# Patient Record
Sex: FEMALE | Race: OTHER | Employment: UNEMPLOYED | URBAN - METROPOLITAN AREA
[De-identification: names, ages, dates, MRNs, and addresses within clinical notes are randomized per-mention and may not be internally consistent; named-entity substitution may affect disease eponyms.]

---

## 2018-01-17 ENCOUNTER — OFFICE VISIT (OUTPATIENT)
Dept: ORTHOPEDIC SURGERY | Age: 23
End: 2018-01-17

## 2018-01-17 VITALS
BODY MASS INDEX: 28.91 KG/M2 | HEIGHT: 66 IN | DIASTOLIC BLOOD PRESSURE: 73 MMHG | SYSTOLIC BLOOD PRESSURE: 122 MMHG | HEART RATE: 78 BPM | WEIGHT: 179.9 LBS

## 2018-01-17 DIAGNOSIS — M25.511 ACUTE PAIN OF RIGHT SHOULDER: Primary | ICD-10-CM

## 2018-01-17 PROCEDURE — 99203 OFFICE O/P NEW LOW 30 MIN: CPT | Performed by: ORTHOPAEDIC SURGERY

## 2018-01-17 PROCEDURE — L3660 SO 8 AB RSTR CAN/WEB PRE OTS: HCPCS | Performed by: ORTHOPAEDIC SURGERY

## 2018-01-17 PROCEDURE — G8419 CALC BMI OUT NRM PARAM NOF/U: HCPCS | Performed by: ORTHOPAEDIC SURGERY

## 2018-01-17 PROCEDURE — G8427 DOCREV CUR MEDS BY ELIG CLIN: HCPCS | Performed by: ORTHOPAEDIC SURGERY

## 2018-01-17 PROCEDURE — 4004F PT TOBACCO SCREEN RCVD TLK: CPT | Performed by: ORTHOPAEDIC SURGERY

## 2018-01-17 PROCEDURE — G8484 FLU IMMUNIZE NO ADMIN: HCPCS | Performed by: ORTHOPAEDIC SURGERY

## 2018-01-17 NOTE — PROGRESS NOTES
that she drinks alcohol. She reports that she does not use drugs. Family History:   No family history on file. REVIEW OF SYSTEMS:   For new problems, a full review of systems will be found scanned in the patient's chart. CONSTITUTIONAL: Denies unexplained weight loss, fevers, chills   NEUROLOGICAL: Denies unsteady gait or progressive weakness  SKIN: Denies skin changes, delayed healing, rash, itching       PHYSICAL EXAM:    Vitals: Blood pressure 122/73, pulse 78, height 5' 5.98\" (1.676 m), weight 179 lb 14.3 oz (81.6 kg), last menstrual period 12/31/2017. GENERAL EXAM:  · General Apparence: Patient is adequately groomed with no evidence of malnutrition. · Orientation: The patient is oriented to time, place and person. · Mood & Affect:The patient's mood and affect are appropriate       Right shoulder PHYSICAL EXAMINATION:  · Inspection:  No visible deformity. No significant edema, erythema or ecchymosis. · Palpation:  Tenderness to palpation diffusely throughout the anterior and posterior shoulder      · Range of Motion: Range of motion not tested secondary to injury    · Strength:  strength equal bilaterally    · Special Tests:  Unable to perform apprehension testing secondary to severe pain. Neurovascular exam is intact distally. · Skin:  There are no rashes, ulcerations or lesions. · Gait & station: Normal gait      · Additional Examinations:        Left Upper Extremity: Examination of the left upper extremity does not show any tenderness, deformity or injury. Range of motion is unremarkable. There is no gross instability. There are no rashes, ulcerations or lesions. Strength and tone are normal.      Diagnostic Testing:      X-rays of the right shoulder were reviewed from her emergency room visit and reveal a well reduced shoulder with no acute bony abnormalities. Orders   No orders of the defined types were placed in this encounter.         Assessment / Treatment Plan: 1.  History of dislocation right shoulder. I discussed the epidemiology of shoulder dislocations with the patient. I discussed with her the nature of this injury and the need for immobilization. She is going to remain in her sling and swath for the next 2 weeks. At that point, she will return and we will begin physical therapy. I stressed to her the importance of ice and rest.    She was provided with a short shoulder immobilizer today. I have personally performed and/or participated in the history, exam and medical decision making and agree with all pertinent clinical information. I have also reviewed and agree with the past medical, family and social history unless otherwise noted. This dictation was performed with a verbal recognition program (DRAGON) and it was checked for errors. It is possible that there are still dictated errors within this office note. If so, please bring any errors to my attention for an addendum. All efforts were made to ensure that this office note is accurate.           Alana León MD

## 2018-01-31 ENCOUNTER — OFFICE VISIT (OUTPATIENT)
Dept: ORTHOPEDIC SURGERY | Age: 23
End: 2018-01-31

## 2018-01-31 VITALS
SYSTOLIC BLOOD PRESSURE: 101 MMHG | WEIGHT: 179.9 LBS | HEIGHT: 66 IN | HEART RATE: 77 BPM | DIASTOLIC BLOOD PRESSURE: 63 MMHG | BODY MASS INDEX: 28.91 KG/M2

## 2018-01-31 DIAGNOSIS — S43.301S: Primary | ICD-10-CM

## 2018-01-31 PROCEDURE — 4004F PT TOBACCO SCREEN RCVD TLK: CPT | Performed by: ORTHOPAEDIC SURGERY

## 2018-01-31 PROCEDURE — G8484 FLU IMMUNIZE NO ADMIN: HCPCS | Performed by: ORTHOPAEDIC SURGERY

## 2018-01-31 PROCEDURE — G8427 DOCREV CUR MEDS BY ELIG CLIN: HCPCS | Performed by: ORTHOPAEDIC SURGERY

## 2018-01-31 PROCEDURE — G8419 CALC BMI OUT NRM PARAM NOF/U: HCPCS | Performed by: ORTHOPAEDIC SURGERY

## 2018-01-31 PROCEDURE — 99214 OFFICE O/P EST MOD 30 MIN: CPT | Performed by: ORTHOPAEDIC SURGERY

## 2018-01-31 NOTE — PROGRESS NOTES
CHIEF COMPLAINT:    Chief Complaint   Patient presents with    Shoulder Pain     CK RIGHT SHOULDER. PT STATES DOING BETTER, STILL SORE W. CERTAIN MOVEMENTS       HISTORY OF PRESENT ILLNESS:                The patient is a 23 y.o. female   History reviewed. No pertinent past medical history. This patient was initially seen on 1/13/18 for a right shoulder subluxation/dislocation event. She's been in the sling primarily for last couple weeks. She has taken it often has worked on moving her shoulder. She is as she is much better but still feels clicking and popping in her shoulder. The pain assessment was noted & is as follows:  Pain Assessment  Location of Pain: Shoulder  Location Modifiers: Right  Severity of Pain: 4  Quality of Pain: Dull, Sharp, Aching  Duration of Pain: A few minutes  Frequency of Pain: Several times daily  Aggravating Factors: Bending, Other (Comment) (CERTAIN MOVEMENTS)  Limiting Behavior: Some  Relieving Factors: Rest  Result of Injury: No  Work-Related Injury: No  Are there other pain locations you wish to document?: No]      Work Status/Functionality:     Past Medical History: Medical history form was reviewed today & can be found in the media tab  History reviewed. No pertinent past medical history. Past Surgical History:     Past Surgical History:   Procedure Laterality Date    CHOLECYSTECTOMY       Current Medications:     Current Outpatient Prescriptions:     ibuprofen (ADVIL;MOTRIN) 800 MG tablet, Take 1 tablet by mouth every 8 hours as needed for Pain, Disp: 20 tablet, Rfl: 0  Allergies:  Review of patient's allergies indicates no known allergies. Social History:    reports that she has been smoking Cigarettes. She has been smoking about 0.50 packs per day. She has never used smokeless tobacco. She reports that she drinks alcohol. She reports that she does not use drugs. Family History:   History reviewed. No pertinent family history.     REVIEW OF SYSTEMS:   For new

## 2018-08-05 ENCOUNTER — APPOINTMENT (OUTPATIENT)
Dept: GENERAL RADIOLOGY | Age: 23
End: 2018-08-05
Payer: MEDICAID

## 2018-08-05 ENCOUNTER — HOSPITAL ENCOUNTER (EMERGENCY)
Age: 23
Discharge: HOME | End: 2018-08-05
Payer: MEDICAID

## 2018-08-05 VITALS
OXYGEN SATURATION: 100 % | SYSTOLIC BLOOD PRESSURE: 121 MMHG | TEMPERATURE: 98.5 F | HEIGHT: 66 IN | RESPIRATION RATE: 16 BRPM | BODY MASS INDEX: 29.41 KG/M2 | HEART RATE: 72 BPM | WEIGHT: 183 LBS | DIASTOLIC BLOOD PRESSURE: 90 MMHG

## 2018-08-05 DIAGNOSIS — S90.812A ABRASION OF LEFT FOOT, INITIAL ENCOUNTER: ICD-10-CM

## 2018-08-05 DIAGNOSIS — S20.212A RIB CONTUSION, LEFT, INITIAL ENCOUNTER: ICD-10-CM
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
## 2018-08-05 PROCEDURE — 71046 X-RAY EXAM CHEST 2 VIEWS: CPT

## 2018-08-05 PROCEDURE — 96372 THER/PROPH/DIAG INJ SC/IM: CPT

## 2018-08-05 PROCEDURE — 99283 EMERGENCY DEPT VISIT LOW MDM: CPT

## 2018-08-05 PROCEDURE — 6360000002 HC RX W HCPCS: Performed by: NURSE PRACTITIONER

## 2018-08-05 RX ORDER — NAPROXEN 500 MG/1
500 TABLET ORAL 2 TIMES DAILY WITH MEALS
Qty: 30 TABLET | Refills: 0 | Status: SHIPPED | OUTPATIENT
Start: 2018-08-05 | End: 2019-01-03

## 2018-08-05 RX ORDER — KETOROLAC TROMETHAMINE 30 MG/ML
30 INJECTION, SOLUTION INTRAMUSCULAR; INTRAVENOUS ONCE
Status: COMPLETED | OUTPATIENT
Start: 2018-08-05 | End: 2018-08-05

## 2018-08-05 RX ORDER — METHOCARBAMOL 500 MG/1
500 TABLET, FILM COATED ORAL 4 TIMES DAILY
Qty: 40 TABLET | Refills: 0 | Status: SHIPPED | OUTPATIENT
Start: 2018-08-05 | End: 2018-08-15

## 2018-08-05 RX ADMIN — KETOROLAC TROMETHAMINE 30 MG: 30 INJECTION, SOLUTION INTRAMUSCULAR at 14:22

## 2018-08-05 ASSESSMENT — ENCOUNTER SYMPTOMS
WHEEZING: 0
NAUSEA: 0
COUGH: 0
SHORTNESS OF BREATH: 0
VOMITING: 0
BACK PAIN: 0
COLOR CHANGE: 0
ABDOMINAL PAIN: 0
DIARRHEA: 0

## 2018-08-05 ASSESSMENT — PAIN SCALES - GENERAL
PAINLEVEL_OUTOF10: 6
PAINLEVEL_OUTOF10: 8
PAINLEVEL_OUTOF10: 7

## 2018-08-05 ASSESSMENT — PAIN DESCRIPTION - ORIENTATION: ORIENTATION: LEFT

## 2018-08-05 ASSESSMENT — PAIN DESCRIPTION - LOCATION: LOCATION: RIB CAGE

## 2018-08-05 NOTE — LETTER
[facility]  ED  [address] [place] [address]  Phone: [phone]  Fax: [phone]               August 5, 2018    Patient: Ashlyn Dong   YOB: 1995   Date of Visit: 8/5/2018       To Whom It May Concern:    Raine Baig was seen and treated in our emergency department on 8/5/2018. She may return to work on August 6, 2018. Barrera Benson       Sincerely,       Bernardo Ontiveros LPN         Signature:__________________________________
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
## 2018-08-05 NOTE — ED NOTES
Patient verbalized understanding of d/c instructions. Pt given Robaxin and Naprosyn scripts. Writer applied dressing to patient's left heel abrasion. Adaptic applied, covered with 4 x 4 gauze, wrapped with a four inch cling. Pt ambulated from ER without difficulty. Pt given a work excuse to return to work tomorrow.       Seda Ornelas LPN  13/64/12 0665

## 2018-09-07 ENCOUNTER — APPOINTMENT (OUTPATIENT)
Dept: GENERAL RADIOLOGY | Age: 23
End: 2018-09-07
Payer: MEDICAID

## 2018-09-07 ENCOUNTER — HOSPITAL ENCOUNTER (EMERGENCY)
Age: 23
Discharge: HOME | End: 2018-09-08
Attending: EMERGENCY MEDICINE
Payer: MEDICAID

## 2018-09-07 DIAGNOSIS — Z72.0 TOBACCO USE: ICD-10-CM

## 2018-09-07 DIAGNOSIS — J40 BRONCHITIS: Primary | ICD-10-CM

## 2018-09-07 PROCEDURE — 71046 X-RAY EXAM CHEST 2 VIEWS: CPT

## 2018-09-07 PROCEDURE — 99283 EMERGENCY DEPT VISIT LOW MDM: CPT

## 2018-09-08 VITALS
DIASTOLIC BLOOD PRESSURE: 76 MMHG | HEART RATE: 79 BPM | RESPIRATION RATE: 20 BRPM | HEIGHT: 66 IN | BODY MASS INDEX: 28.93 KG/M2 | WEIGHT: 180 LBS | OXYGEN SATURATION: 99 % | SYSTOLIC BLOOD PRESSURE: 120 MMHG | TEMPERATURE: 99 F

## 2018-09-08 RX ORDER — ALBUTEROL SULFATE 90 UG/1
2 AEROSOL, METERED RESPIRATORY (INHALATION) EVERY 4 HOURS PRN
Qty: 1 INHALER | Refills: 3 | Status: SHIPPED | OUTPATIENT
Start: 2018-09-08 | End: 2019-01-03

## 2018-09-08 RX ORDER — METHYLPREDNISOLONE 4 MG/1
TABLET ORAL
Qty: 1 KIT | Refills: 0 | Status: SHIPPED | OUTPATIENT
Start: 2018-09-08 | End: 2019-01-03

## 2018-09-08 NOTE — ED PROVIDER NOTES
I independently performed a history and physical on [address]. All diagnostic, treatment, and disposition decisions were made by myself in conjunction with the resident below    For further details of [address] emergency department encounter, please see Nabeel Navarrete DO's note for full details of patient's visit. Patient presents to the emergency room department complaining of cough. She reports upper respiratory congestion with somewhat productive cough over the last 2 weeks. No fevers, chills or sweats. Patient has ongoing tobacco abuse. Physical exam: Gen.: No acute distress. Moderate rhonchi. No wheezing, or rales. No work of breathing. Assessment/plan:    1. Bronchitis    2.  Tobacco use           James Mcnally DO  09/08/18 0021

## 2018-09-14 ENCOUNTER — HOSPITAL ENCOUNTER (EMERGENCY)
Age: 23
Discharge: HOME | End: 2018-09-15
Payer: MEDICAID

## 2018-09-14 VITALS
DIASTOLIC BLOOD PRESSURE: 75 MMHG | SYSTOLIC BLOOD PRESSURE: 117 MMHG | TEMPERATURE: 98.5 F | OXYGEN SATURATION: 100 % | BODY MASS INDEX: 28.93 KG/M2 | RESPIRATION RATE: 16 BRPM | WEIGHT: 180 LBS | HEIGHT: 66 IN | HEART RATE: 84 BPM

## 2018-09-14 DIAGNOSIS — N30.01 ACUTE CYSTITIS WITH HEMATURIA: Primary | ICD-10-CM

## 2018-09-14 LAB
BILIRUBIN URINE: NEGATIVE
BLOOD, URINE: ABNORMAL
CLARITY: CLEAR
COLOR: YELLOW
GLUCOSE URINE: NEGATIVE MG/DL
HCG(URINE) PREGNANCY TEST: NEGATIVE
KETONES, URINE: NEGATIVE MG/DL
LEUKOCYTE ESTERASE, URINE: ABNORMAL
MICROSCOPIC EXAMINATION: YES
NITRITE, URINE: POSITIVE
PH UA: 6
PROTEIN UA: 100 MG/DL
SPECIFIC GRAVITY UA: 1.02
URINE REFLEX TO CULTURE: YES
URINE TYPE: ABNORMAL
UROBILINOGEN, URINE: 1 E.U./DL

## 2018-09-14 PROCEDURE — 99283 EMERGENCY DEPT VISIT LOW MDM: CPT

## 2018-09-14 PROCEDURE — 81001 URINALYSIS AUTO W/SCOPE: CPT

## 2018-09-14 PROCEDURE — 84703 CHORIONIC GONADOTROPIN ASSAY: CPT

## 2018-09-14 PROCEDURE — 87086 URINE CULTURE/COLONY COUNT: CPT

## 2018-09-14 ASSESSMENT — PAIN SCALES - GENERAL: PAINLEVEL_OUTOF10: 4

## 2018-09-15 LAB
BACTERIA: ABNORMAL /HPF
EPITHELIAL CELLS, UA: ABNORMAL /HPF
MUCUS: ABNORMAL /LPF
RBC UA: >100 /HPF (ref 0–2)
WBC UA: ABNORMAL /HPF (ref 0–5)

## 2018-09-15 PROCEDURE — 6370000000 HC RX 637 (ALT 250 FOR IP): Performed by: PHYSICIAN ASSISTANT

## 2018-09-15 RX ORDER — CEPHALEXIN 500 MG/1
500 CAPSULE ORAL 2 TIMES DAILY
Qty: 6 CAPSULE | Refills: 0 | Status: SHIPPED | OUTPATIENT
Start: 2018-09-15 | End: 2018-09-18

## 2018-09-15 RX ORDER — CEPHALEXIN 250 MG/1
500 CAPSULE ORAL ONCE
Status: COMPLETED | OUTPATIENT
Start: 2018-09-15 | End: 2018-09-15

## 2018-09-15 RX ORDER — PHENAZOPYRIDINE HYDROCHLORIDE 100 MG/1
100 TABLET, FILM COATED ORAL 3 TIMES DAILY PRN
Qty: 6 TABLET | Refills: 0 | Status: SHIPPED | OUTPATIENT
Start: 2018-09-15 | End: 2018-09-18

## 2018-09-15 RX ORDER — PHENAZOPYRIDINE HYDROCHLORIDE 100 MG/1
200 TABLET, FILM COATED ORAL ONCE
Status: COMPLETED | OUTPATIENT
Start: 2018-09-15 | End: 2018-09-15

## 2018-09-15 RX ADMIN — PHENAZOPYRIDINE HYDROCHLORIDE 200 MG: 100 TABLET ORAL at 00:11

## 2018-09-15 RX ADMIN — CEPHALEXIN 500 MG: 250 CAPSULE ORAL at 00:11

## 2018-09-15 NOTE — ED PROVIDER NOTES
Evaluated by advanced practice provider          [facility]  ED  eMERGENCY dEPARTMENT eNCOUnter        Pt Name: Bella Mendiola  MRN: [id number]  [place] 1995  Date of evaluation: 9/14/2018  Provider: Cait Ziegler PA-C  PCP: No primary care provider on file. ED Attending: No att. providers found    [address] [place] [address]       Chief Complaint   Patient presents with    Urinary Tract Infection     hurts after I pee started yesterday       HISTORY OF PRESENT ILLNESS   (Location/Symptom, Timing/Onset, Context/Setting, Quality, Duration, Modifying Factors, Severity)  Note limiting factors. Bella Mendiola is a 20 y.o. female presents to the emergency department with dysuria for the past 2 days. The patient states that she has noticed some frequency, but cannot Urinate when she has to go. She denies any fever, chills, nausea, vomiting, chest pain, shortness breath, abdominal pain, diarrhea, vaginal pain, vaginal discharge or abnormal bleeding. She states that she's on nexplanon for birth control. She denies being diabetic. Nursing Notes were all reviewed and agreed with or any disagreements were addressed  in the HPI. REVIEW OF SYSTEMS    (2-9 systems for level 4, 10 or more for level 5)     Review of Systems    Positives and Pertinent negatives as per HPI. Except as noted above in the ROS, all other systems were reviewed and negative. PAST MEDICAL HISTORY   No past medical history on file.       SURGICAL HISTORY       Past Surgical History:   Procedure Laterality Date    CHOLECYSTECTOMY           CURRENT MEDICATIONS       Discharge Medication List as of 9/15/2018 12:09 AM      CONTINUE these medications which have NOT CHANGED    Details   methylPREDNISolone (MEDROL, ASH,) 4 MG tablet Take by mouth., Disp-1 kit, R-0Print      albuterol sulfate HFA (PROVENTIL HFA) 108 (90 Base) MCG/ACT inhaler Inhale 2 puffs into the lungs every 4 hours as needed for Wheezing, Disp-1 Inhaler, R-3Print naproxen (NAPROSYN) 500 MG tablet Take 1 tablet by mouth 2 times daily (with meals), Disp-30 tablet, R-0Print               ALLERGIES     Patient has no known allergies. FAMILY HISTORY     No family history on file. SOCIAL HISTORY       Social History     Social History    Marital status: Single     Spouse name: N/A    Number of children: N/A    Years of education: N/A     Social History Main Topics    Smoking status: Current Every Day Smoker     Packs/day: 0.50     Types: Cigarettes    Smokeless tobacco: Never Used    Alcohol use Yes      Comment: socialy     Drug use: No    Sexual activity: Not on file     Other Topics Concern    Not on file     Social History Narrative    No narrative on file       SCREENINGS             PHYSICAL EXAM    (up to 7 for level 4, 8 or more for level 5)     ED Triage Vitals [09/14/18 2321]   BP Temp Temp Source Pulse Resp SpO2 Height Weight   117/75 98.5 °F (36.9 °C) Oral 84 16 100 % 5' 6\" (1.676 m) 180 lb (81.6 kg)       Physical Exam   Constitutional: She is oriented to person, place, and time. She appears well-developed and well-nourished. HENT:   Head: Normocephalic and atraumatic. Right Ear: External ear normal.   Left Ear: External ear normal.   Nose: Nose normal.   Eyes: Conjunctivae are normal. Right eye exhibits no discharge. Left eye exhibits no discharge. No scleral icterus. Neck: Normal range of motion. Neck supple. Cardiovascular: Normal rate, regular rhythm, normal heart sounds and intact distal pulses. Exam reveals no gallop and no friction rub. No murmur heard. Pulmonary/Chest: Effort normal and breath sounds normal. No respiratory distress. She has no wheezes. She has no rales. She exhibits no tenderness. Abdominal: Soft. Normal appearance and bowel sounds are normal. She exhibits no distension and no mass. There is tenderness (Mild suprapubic).  There is no rigidity, no rebound, no guarding, no tenderness at McBurney's point and negative Dominguez's sign. Neurological: She is alert and oriented to person, place, and time. Skin: Skin is warm and dry. She is not diaphoretic. No pallor. Psychiatric: She has a normal mood and affect. Her behavior is normal. Thought content normal.   Nursing note and vitals reviewed. DIAGNOSTIC RESULTS   LABS:    Labs Reviewed   URINE RT REFLEX TO CULTURE - Abnormal; Notable for the following:        Result Value    Blood, Urine LARGE (*)     Protein,  (*)     Nitrite, Urine POSITIVE (*)     Leukocyte Esterase, Urine TRACE (*)     All other components within normal limits    Narrative:     Performed at:  [address], [facility] [employer]   Phone [phone]   MICROSCOPIC URINALYSIS - Abnormal; Notable for the following:     Mucus, UA Rare (*)     WBC, UA 20-50 (*)     RBC, UA >100 (*)     Bacteria, UA 1+ (*)     All other components within normal limits    Narrative:     Performed at:  [facility]) [address], [facility] [employer]   Phone [phone]   [address] [facility], URINE    Narrative:     Performed at:  [address], [facility] [employer]   Phone [phone]       All other labs were within normal range or not returned as of this dictation. EKG: All EKG's are interpreted by the Emergency Department Physician who either signs or Co-signs this chart in the absence of a cardiologist.  Please see their note for interpretation of EKG. RADIOLOGY:   Non-plain film images such as CT, Ultrasound and MRI are read by the radiologist. Plain radiographic images are visualized and preliminarily interpreted by the  ED Provider with the below findings:        Interpretation per the Radiologist below, if available at the time of this note:    No orders to display     No results found.       PROCEDURES   Unless otherwise noted below, none tablet by mouth 3 times daily as needed for Pain, Disp-6 tablet, R-0Print             DISCONTINUED MEDICATIONS:  Discharge Medication List as of 9/15/2018 12:09 AM                 (Please note that portions of this note were completed with a voice recognition program.  Efforts were made to edit the dictations but occasionally words are mis-transcribed.)    Charlene Lizarraga PA-C (electronically signed)           Andres Adhikari PA-C  09/15/18 0205

## 2018-09-16 LAB — URINE CULTURE, ROUTINE: NORMAL

## 2018-12-06 ENCOUNTER — HOSPITAL ENCOUNTER (OUTPATIENT)
Dept: PHYSICAL THERAPY | Age: 23
Setting detail: THERAPIES SERIES
Discharge: HOME | End: 2018-12-06
Payer: MEDICARE

## 2018-12-06 PROCEDURE — 97161 PT EVAL LOW COMPLEX 20 MIN: CPT

## 2018-12-06 PROCEDURE — G8978 MOBILITY CURRENT STATUS: HCPCS

## 2018-12-06 PROCEDURE — 97530 THERAPEUTIC ACTIVITIES: CPT

## 2018-12-06 PROCEDURE — G8979 MOBILITY GOAL STATUS: HCPCS

## 2018-12-06 ASSESSMENT — PAIN DESCRIPTION - ORIENTATION: ORIENTATION: LEFT

## 2018-12-06 ASSESSMENT — PAIN SCALES - GENERAL: PAINLEVEL_OUTOF10: 5

## 2018-12-10 ENCOUNTER — HOSPITAL ENCOUNTER (OUTPATIENT)
Dept: PHYSICAL THERAPY | Age: 23
Setting detail: THERAPIES SERIES
Discharge: HOME | End: 2018-12-10
Payer: MEDICARE

## 2018-12-10 PROCEDURE — 97110 THERAPEUTIC EXERCISES: CPT

## 2018-12-10 NOTE — FLOWSHEET NOTE
Physical Therapy Daily Treatment Note    Date:  12/10/2018    Patient Name:  Bud Gandhi    :  1995  MRN: [id number]  Restrictions/Precautions:    Medical/Treatment Diagnosis Information:  · Diagnosis: s/p ORIF acetabulum  Insurance/Certification information:  PT Insurance Information: [place]  Physician Information:  Referring Practitioner: Aloysius Goodell, MD  Plan of care signed (Y/N):  N  Visit# / total visits:       G-Code (if applicable):         PT G-Codes  Functional Assessment Tool Used: LEFS  Score: 34  Functional Limitation: Mobility: Walking and moving around  Mobility: Walking and Moving Around Current Status (): At least 40 percent but less than 60 percent impaired, limited or restricted  Mobility: Walking and Moving Around Goal Status (): At least 20 percent but less than 40 percent impaired, limited or restricted    Time in:   11:30     Timed Treatment: 30 Total Treatment Time:  30  ________________________________________________________________________________________    Pain Level:    /10  SUBJECTIVE:  Pt reports that she is still walking with the limp but doing well otherwise. OBJECTIVE:     Exercise/Equipment Resistance/Repetitions Other comments   TG 5 min           Bridge 10x3\" hold    clam 10x3\" hold LLE    PHE x10 LLE           Standing hip stack 2x30\" EO  X30\" EC    Lateral stepping  2 laps x20' With red TB                                                                                     Other Therapeutic Activities:  Pt educated on POC and HEP. Pt also educated on proper gait mechanics and muscle firing patterns. Manual Treatments:         Modalities:      Test/Measurements:  See eval       ASSESSMENT:     Pt tolerated session well. Noticeable fasciculations and fatigue on glute muscle complex throughout exercises. Initiated HEP without issue.     Treatment/Activity Tolerance:   [x] Patient tolerated treatment well [] Patient limited by sherry  [] Patient

## 2018-12-13 ENCOUNTER — HOSPITAL ENCOUNTER (OUTPATIENT)
Dept: PHYSICAL THERAPY | Age: 23
Setting detail: THERAPIES SERIES
Discharge: HOME | End: 2018-12-13
Payer: MEDICARE

## 2018-12-13 PROCEDURE — 97110 THERAPEUTIC EXERCISES: CPT

## 2018-12-17 ENCOUNTER — HOSPITAL ENCOUNTER (OUTPATIENT)
Dept: PHYSICAL THERAPY | Age: 23
Setting detail: THERAPIES SERIES
Discharge: HOME | End: 2018-12-17
Payer: MEDICARE

## 2018-12-17 PROCEDURE — 97110 THERAPEUTIC EXERCISES: CPT

## 2018-12-17 NOTE — FLOWSHEET NOTE
noticeable antalgic pattern. Treatment/Activity Tolerance:   [x] Patient tolerated treatment well [] Patient limited by fatique  [] Patient limited by pain [] Patient limited by other medical complications  [] Other:     Goals:    Short term goals  Time Frame for Short term goals: 4 weeks  Short term goal 1: pt will be indep in HEP  Short term goal 2: pt will decrease pain with ambulation 50%  Short term goal 3: pt will increase L hip strength to 4/5  Short term goal 4: pt will ambulate without antalgic sequence  Short term goal 5: pt will return to work without limitations           Plan: [x] Continue per plan of care [] Alter current plan (see comments)   [] Plan of care initiated [] Hold pending MD visit [] Discharge      Plan for Next Session:  Biomechanical correction of problems as they present. Facilitate correct muscle firing patterns and activation with appropriate activities. Progress patient as tolerated.      Re-Certification Due Date:         Signature:  Jason Mcintyre, PT

## 2018-12-20 ENCOUNTER — HOSPITAL ENCOUNTER (OUTPATIENT)
Dept: PHYSICAL THERAPY | Age: 23
Setting detail: THERAPIES SERIES
Discharge: HOME | End: 2018-12-20
Payer: MEDICARE

## 2018-12-20 PROCEDURE — 97110 THERAPEUTIC EXERCISES: CPT

## 2018-12-31 ENCOUNTER — HOSPITAL ENCOUNTER (OUTPATIENT)
Dept: PHYSICAL THERAPY | Age: 23
Setting detail: THERAPIES SERIES
Discharge: HOME | End: 2018-12-31
Payer: MEDICARE

## 2018-12-31 PROCEDURE — 97110 THERAPEUTIC EXERCISES: CPT

## 2019-01-03 ENCOUNTER — APPOINTMENT (OUTPATIENT)
Dept: GENERAL RADIOLOGY | Age: 24
End: 2019-01-03
Payer: MEDICARE

## 2019-01-03 ENCOUNTER — HOSPITAL ENCOUNTER (EMERGENCY)
Age: 24
Discharge: HOME | End: 2019-01-04
Payer: MEDICARE

## 2019-01-03 VITALS
WEIGHT: 195 LBS | RESPIRATION RATE: 18 BRPM | OXYGEN SATURATION: 95 % | HEART RATE: 79 BPM | DIASTOLIC BLOOD PRESSURE: 80 MMHG | TEMPERATURE: 97.4 F | HEIGHT: 66 IN | BODY MASS INDEX: 31.34 KG/M2 | SYSTOLIC BLOOD PRESSURE: 127 MMHG

## 2019-01-03 DIAGNOSIS — S76.012A HIP STRAIN, LEFT, INITIAL ENCOUNTER: Primary | ICD-10-CM

## 2019-01-03 LAB — HCG(URINE) PREGNANCY TEST: NEGATIVE

## 2019-01-03 PROCEDURE — 84703 CHORIONIC GONADOTROPIN ASSAY: CPT

## 2019-01-03 PROCEDURE — 99283 EMERGENCY DEPT VISIT LOW MDM: CPT

## 2019-01-03 PROCEDURE — 73501 X-RAY EXAM HIP UNI 1 VIEW: CPT

## 2019-01-03 ASSESSMENT — PAIN SCALES - GENERAL: PAINLEVEL_OUTOF10: 7

## 2019-01-04 ENCOUNTER — HOSPITAL ENCOUNTER (OUTPATIENT)
Dept: PHYSICAL THERAPY | Age: 24
Setting detail: THERAPIES SERIES
Discharge: HOME | End: 2019-01-04
Payer: MEDICARE

## 2019-01-04 RX ORDER — OXYCODONE HYDROCHLORIDE AND ACETAMINOPHEN 5; 325 MG/1; MG/1
1 TABLET ORAL EVERY 6 HOURS PRN
Qty: 5 TABLET | Refills: 0 | Status: SHIPPED | OUTPATIENT
Start: 2019-01-04 | End: 2019-01-07

## 2019-01-04 RX ORDER — NAPROXEN 375 MG/1
375 TABLET ORAL 2 TIMES DAILY WITH MEALS
Qty: 14 TABLET | Refills: 0 | Status: SHIPPED | OUTPATIENT
Start: 2019-01-04 | End: 2019-08-04

## 2019-07-08 ENCOUNTER — HOSPITAL ENCOUNTER (EMERGENCY)
Age: 24
Discharge: HOME | End: 2019-07-08
Payer: MEDICARE

## 2019-07-08 VITALS
TEMPERATURE: 98.5 F | HEART RATE: 64 BPM | RESPIRATION RATE: 14 BRPM | DIASTOLIC BLOOD PRESSURE: 67 MMHG | SYSTOLIC BLOOD PRESSURE: 102 MMHG | OXYGEN SATURATION: 97 %

## 2019-07-08 DIAGNOSIS — N30.01 ACUTE CYSTITIS WITH HEMATURIA: Primary | ICD-10-CM

## 2019-07-08 LAB
BACTERIA: ABNORMAL /HPF
BILIRUBIN URINE: NEGATIVE
BLOOD, URINE: ABNORMAL
CLARITY: CLEAR
COLOR: YELLOW
GLUCOSE URINE: NEGATIVE MG/DL
HCG(URINE) PREGNANCY TEST: NEGATIVE
KETONES, URINE: NEGATIVE MG/DL
LEUKOCYTE ESTERASE, URINE: ABNORMAL
MICROSCOPIC EXAMINATION: YES
MUCUS: ABNORMAL /LPF
NITRITE, URINE: NEGATIVE
PH UA: 8.5 (ref 5–8)
PROTEIN UA: ABNORMAL MG/DL
RBC UA: ABNORMAL /HPF (ref 0–2)
SPECIFIC GRAVITY UA: 1.01 (ref 1–1.03)
URINE TYPE: ABNORMAL
UROBILINOGEN, URINE: 0.2 E.U./DL
WBC UA: >100 /HPF (ref 0–5)

## 2019-07-08 PROCEDURE — 81001 URINALYSIS AUTO W/SCOPE: CPT

## 2019-07-08 PROCEDURE — 99283 EMERGENCY DEPT VISIT LOW MDM: CPT

## 2019-07-08 PROCEDURE — 6370000000 HC RX 637 (ALT 250 FOR IP): Performed by: NURSE PRACTITIONER

## 2019-07-08 PROCEDURE — 84703 CHORIONIC GONADOTROPIN ASSAY: CPT

## 2019-07-08 RX ORDER — PHENAZOPYRIDINE HYDROCHLORIDE 200 MG/1
200 TABLET, FILM COATED ORAL 3 TIMES DAILY PRN
Qty: 6 TABLET | Refills: 0 | Status: SHIPPED | OUTPATIENT
Start: 2019-07-08 | End: 2019-07-11

## 2019-07-08 RX ORDER — CEFDINIR 300 MG/1
300 CAPSULE ORAL 2 TIMES DAILY
Qty: 10 CAPSULE | Refills: 0 | Status: SHIPPED | OUTPATIENT
Start: 2019-07-08 | End: 2019-07-13

## 2019-07-08 RX ORDER — CEFDINIR 300 MG/1
300 CAPSULE ORAL ONCE
Status: COMPLETED | OUTPATIENT
Start: 2019-07-08 | End: 2019-07-08

## 2019-07-08 RX ORDER — PHENAZOPYRIDINE HYDROCHLORIDE 100 MG/1
200 TABLET, FILM COATED ORAL ONCE
Status: COMPLETED | OUTPATIENT
Start: 2019-07-08 | End: 2019-07-08

## 2019-07-08 RX ADMIN — PHENAZOPYRIDINE HYDROCHLORIDE 200 MG: 100 TABLET ORAL at 22:48

## 2019-07-08 RX ADMIN — CEFDINIR 300 MG: 300 CAPSULE ORAL at 22:48

## 2019-07-09 NOTE — ED PROVIDER NOTES
Normal ROM. SKIN: Warm/dry. Skin is intact. No rashes or lesions noted. PSYCHIATRIC: Mood and affect appropriate. Speech is clear and articulate. NEUROLOGIC: Alert and oriented. No focal motor or sensory deficits. Gait was observed and is normal.    LABS   Results for orders placed or performed during the hospital encounter of 07/08/19   Pregnancy, Urine   Result Value Ref Range    HCG(Urine) Pregnancy Test Negative Detects HCG level >20 MIU/mL   Urinalysis   Result Value Ref Range    Color, UA Yellow Straw/Yellow    Clarity, UA Clear Clear    Glucose, Ur Negative Negative mg/dL    Bilirubin Urine Negative Negative    Ketones, Urine Negative Negative mg/dL    Specific Gravity, UA 1.015 1.005 - 1.030    Blood, Urine SMALL (A) Negative    pH, UA 8.5 (A) 5.0 - 8.0    Protein, UA TRACE (A) Negative mg/dL    Urobilinogen, Urine 0.2 <2.0 E.U./dL    Nitrite, Urine Negative Negative    Leukocyte Esterase, Urine MODERATE (A) Negative    Microscopic Examination YES     Urine Type Not Specified    Microscopic Urinalysis   Result Value Ref Range    Mucus, UA 1+ (A) /LPF    WBC, UA >100 (A) 0 - 5 /HPF    RBC, UA 0-2 0 - 2 /HPF    Bacteria, UA 1+ (A) /HPF       RADIOLOGY    No results found. ED COURSE/MDM  Patient seen and evaluated. Old records reviewed. Diagnostic testing reviewed and results discussed. I have evaluated this patient. My supervising physician was available for consultation. Zach Gong presented to the ED today with above noted complaints. Physical exam does not reveal abdominal tenderness palpation. Currently patient is afebrile, not tachycardic, BP normotensive. She is well saturated on room air. UA does show moderate amount of leukocytes and small amount of blood. On upon microscopic she is greater than 100 white blood cells and 1+ bacteria. This will be sent for culture and patient will be treated with Omnicef. She will be given a first dose here in the ED as well as Pyridium.   She

## 2019-08-04 ENCOUNTER — APPOINTMENT (OUTPATIENT)
Dept: GENERAL RADIOLOGY | Age: 24
End: 2019-08-04
Payer: MEDICARE

## 2019-08-04 ENCOUNTER — HOSPITAL ENCOUNTER (EMERGENCY)
Age: 24
Discharge: HOME | End: 2019-08-04
Payer: MEDICARE

## 2019-08-04 VITALS
BODY MASS INDEX: 31.65 KG/M2 | OXYGEN SATURATION: 98 % | HEART RATE: 80 BPM | WEIGHT: 190 LBS | DIASTOLIC BLOOD PRESSURE: 79 MMHG | RESPIRATION RATE: 18 BRPM | SYSTOLIC BLOOD PRESSURE: 112 MMHG | HEIGHT: 65 IN | TEMPERATURE: 97.4 F

## 2019-08-04 DIAGNOSIS — M24.411 RECURRENT ANTERIOR DISLOCATION OF RIGHT SHOULDER: ICD-10-CM

## 2019-08-04 DIAGNOSIS — S46.911A STRAIN OF RIGHT SHOULDER, INITIAL ENCOUNTER: Primary | ICD-10-CM

## 2019-08-04 PROCEDURE — L3660 SO 8 AB RSTR CAN/WEB PRE OTS: HCPCS

## 2019-08-04 PROCEDURE — 73010 X-RAY EXAM OF SHOULDER BLADE: CPT

## 2019-08-04 PROCEDURE — 6370000000 HC RX 637 (ALT 250 FOR IP): Performed by: PHYSICIAN ASSISTANT

## 2019-08-04 PROCEDURE — 99283 EMERGENCY DEPT VISIT LOW MDM: CPT

## 2019-08-04 RX ORDER — TRAMADOL HYDROCHLORIDE 50 MG/1
50 TABLET ORAL EVERY 4 HOURS PRN
Qty: 18 TABLET | Refills: 0 | Status: SHIPPED | OUTPATIENT
Start: 2019-08-04 | End: 2019-08-07

## 2019-08-04 RX ORDER — ACETAMINOPHEN 500 MG
1000 TABLET ORAL ONCE
Status: COMPLETED | OUTPATIENT
Start: 2019-08-04 | End: 2019-08-04

## 2019-08-04 RX ADMIN — ACETAMINOPHEN 1000 MG: 500 TABLET ORAL at 18:15

## 2019-08-04 ASSESSMENT — PAIN SCALES - GENERAL: PAINLEVEL_OUTOF10: 4

## 2019-08-04 NOTE — ED PROVIDER NOTES
go there for her surgery. Nursing Notes were all reviewed and agreed with or any disagreements were addressed  in the HPI. REVIEW OF SYSTEMS    (2-9 systems for level 4, 10 or more for level 5)     Review of Systems    Positives and Pertinent negatives as per HPI. Except as noted abovein the ROS, all other systems were reviewed and negative. PAST MEDICAL HISTORY   History reviewed. No pertinent past medical history. SURGICAL HISTORY     Past Surgical History:   Procedure Laterality Date    BONY PELVIS SURGERY      CHOLECYSTECTOMY           CURRENTMEDICATIONS       Discharge Medication List as of 8/4/2019  6:40 PM            ALLERGIES     Patient has no known allergies. FAMILYHISTORY     History reviewed. No pertinent family history.        SOCIAL HISTORY       Social History     Socioeconomic History    Marital status: Single     Spouse name: None    Number of children: None    Years of education: None    Highest education level: None   Occupational History    None   Social Needs    Financial resource strain: None    Food insecurity:     Worry: None     Inability: None    Transportation needs:     Medical: None     Non-medical: None   Tobacco Use    Smoking status: Current Every Day Smoker     Packs/day: 0.50     Types: Cigarettes    Smokeless tobacco: Never Used   Substance and Sexual Activity    Alcohol use: Yes     Comment: socialy     Drug use: No    Sexual activity: None   Lifestyle    Physical activity:     Days per week: None     Minutes per session: None    Stress: None   Relationships    Social connections:     Talks on phone: None     Gets together: None     Attends Tenriism service: None     Active member of club or organization: None     Attends meetings of clubs or organizations: None     Relationship status: None    Intimate partner violence:     Fear of current or ex partner: None     Emotionally abused: None     Physically abused: None     Forced sexual No significant sign of shoulder instability or current dislocation however full range of motion was not assessed given patient's history of recent suspected dislocation. X-rays were obtained, there is no evidence of acute osseous abnormality, no evidence of Hill-Sachs or incorrect lesions. Bony alignment appears intact. Range of motion strength and sensation intact distally to the shoulder on the right side. Patient was given Tylenol in the emergency department with improvement in her symptoms. Given patient's current history and progression of her symptoms I strongly encouraged her to follow-up with orthopedics, I also gave her referral to local orthopedic group in case she has not want to return to where she was recently diagnosed. Patient will be given sling and swath and given strict range of motion limitations of the shoulder. She will be given a work note. Based on patient's clinical history and clinical findings I currently estimate there is low probability for: compartment syndrome, DVT, septic arthritis, current dislocation, surgically emergent tendon or NV injury. We have discussed the symptoms which are most concerning (e.g., changing or worsening pain, numbness, weakness) that necessitate immediate return. Patient advised to return to the ER if she feels that she is dislocating again. Pt is in agreement with the current plan and all questions were addressed. FINAL IMPRESSION      1. Strain of right shoulder, initial encounter    2.  Recurrent anterior dislocation of right shoulder          DISPOSITION/PLAN   DISPOSITION Decision To Discharge 08/04/2019 06:34:45 PM      PATIENT REFERREDTO:  ABRAHAM Yoon [address]  [place] [phone]    Schedule an appointment as soon as possible for a visit   For a recheck in 2-5  days,, sooner if no improvement or worsening of symptoms    [facility] [place] ED  [address]  [phone]    If

## 2019-10-13 ENCOUNTER — APPOINTMENT (OUTPATIENT)
Dept: GENERAL RADIOLOGY | Age: 24
End: 2019-10-13
Payer: MEDICARE

## 2019-10-13 ENCOUNTER — HOSPITAL ENCOUNTER (EMERGENCY)
Age: 24
Discharge: HOME | End: 2019-10-13
Payer: MEDICARE

## 2019-10-13 VITALS
RESPIRATION RATE: 19 BRPM | TEMPERATURE: 98 F | WEIGHT: 195 LBS | OXYGEN SATURATION: 98 % | HEART RATE: 70 BPM | DIASTOLIC BLOOD PRESSURE: 74 MMHG | BODY MASS INDEX: 31.34 KG/M2 | SYSTOLIC BLOOD PRESSURE: 119 MMHG | HEIGHT: 66 IN

## 2019-10-13 DIAGNOSIS — M25.511 ACUTE PAIN OF RIGHT SHOULDER: Primary | ICD-10-CM

## 2019-10-13 PROCEDURE — 73030 X-RAY EXAM OF SHOULDER: CPT

## 2019-10-13 PROCEDURE — 99283 EMERGENCY DEPT VISIT LOW MDM: CPT

## 2019-10-13 PROCEDURE — 6370000000 HC RX 637 (ALT 250 FOR IP): Performed by: NURSE PRACTITIONER

## 2019-10-13 RX ORDER — HYDROCODONE BITARTRATE AND ACETAMINOPHEN 5; 325 MG/1; MG/1
1 TABLET ORAL ONCE
Status: COMPLETED | OUTPATIENT
Start: 2019-10-13 | End: 2019-10-13

## 2019-10-13 RX ORDER — HYDROCODONE BITARTRATE AND ACETAMINOPHEN 5; 325 MG/1; MG/1
1 TABLET ORAL EVERY 6 HOURS PRN
Qty: 8 TABLET | Refills: 0 | Status: SHIPPED | OUTPATIENT
Start: 2019-10-13 | End: 2019-10-16

## 2019-10-13 RX ADMIN — HYDROCODONE BITARTRATE AND ACETAMINOPHEN 1 TABLET: 5; 325 TABLET ORAL at 14:12

## 2019-10-13 ASSESSMENT — PAIN DESCRIPTION - PAIN TYPE: TYPE: ACUTE PAIN

## 2019-10-13 ASSESSMENT — PAIN SCALES - GENERAL: PAINLEVEL_OUTOF10: 7

## 2019-10-17 ENCOUNTER — HOSPITAL ENCOUNTER (EMERGENCY)
Age: 24
Discharge: HOME | End: 2019-10-17
Attending: EMERGENCY MEDICINE
Payer: MEDICARE

## 2019-10-17 VITALS
BODY MASS INDEX: 31.5 KG/M2 | WEIGHT: 196 LBS | HEART RATE: 80 BPM | DIASTOLIC BLOOD PRESSURE: 70 MMHG | TEMPERATURE: 98.6 F | RESPIRATION RATE: 12 BRPM | OXYGEN SATURATION: 100 % | SYSTOLIC BLOOD PRESSURE: 110 MMHG | HEIGHT: 66 IN

## 2019-10-17 DIAGNOSIS — N93.9 VAGINAL BLEEDING: Primary | ICD-10-CM

## 2019-10-17 LAB
A/G RATIO: 1.3 (ref 1.1–2.2)
ALBUMIN SERPL-MCNC: 4.3 G/DL (ref 3.4–5)
ALP BLD-CCNC: 72 U/L (ref 40–129)
ALT SERPL-CCNC: 12 U/L (ref 10–40)
ANION GAP SERPL CALCULATED.3IONS-SCNC: 11 MMOL/L (ref 3–16)
AST SERPL-CCNC: 14 U/L (ref 15–37)
BASOPHILS ABSOLUTE: 0.1 K/UL (ref 0–0.2)
BASOPHILS RELATIVE PERCENT: 0.7 %
BILIRUB SERPL-MCNC: <0.2 MG/DL (ref 0–1)
BILIRUBIN URINE: NEGATIVE
BLOOD, URINE: ABNORMAL
BUN BLDV-MCNC: 10 MG/DL (ref 7–20)
CALCIUM SERPL-MCNC: 9.4 MG/DL (ref 8.3–10.6)
CHLORIDE BLD-SCNC: 101 MMOL/L (ref 99–110)
CLARITY: CLEAR
CO2: 27 MMOL/L (ref 21–32)
COLOR: YELLOW
CREAT SERPL-MCNC: 0.6 MG/DL (ref 0.6–1.1)
EOSINOPHILS ABSOLUTE: 0.1 K/UL (ref 0–0.6)
EOSINOPHILS RELATIVE PERCENT: 1.3 %
EPITHELIAL CELLS, UA: NORMAL /HPF
GFR AFRICAN AMERICAN: >60
GFR NON-AFRICAN AMERICAN: >60
GLOBULIN: 3.3 G/DL
GLUCOSE BLD-MCNC: 104 MG/DL (ref 70–99)
GLUCOSE URINE: NEGATIVE MG/DL
HCG QUALITATIVE: NEGATIVE
HCT VFR BLD CALC: 38.5 % (ref 36–48)
HEMOGLOBIN: 13 G/DL (ref 12–16)
KETONES, URINE: NEGATIVE MG/DL
LEUKOCYTE ESTERASE, URINE: NEGATIVE
LYMPHOCYTES ABSOLUTE: 2.8 K/UL (ref 1–5.1)
LYMPHOCYTES RELATIVE PERCENT: 27.4 %
MCH RBC QN AUTO: 29.9 PG (ref 26–34)
MCHC RBC AUTO-ENTMCNC: 33.9 G/DL (ref 31–36)
MCV RBC AUTO: 88.3 FL (ref 80–100)
MICROSCOPIC EXAMINATION: YES
MONOCYTES ABSOLUTE: 0.6 K/UL (ref 0–1.3)
MONOCYTES RELATIVE PERCENT: 5.7 %
NEUTROPHILS ABSOLUTE: 6.7 K/UL (ref 1.7–7.7)
NEUTROPHILS RELATIVE PERCENT: 64.9 %
NITRITE, URINE: NEGATIVE
PDW BLD-RTO: 13.5 % (ref 12.4–15.4)
PH UA: 6 (ref 5–8)
PLATELET # BLD: 252 K/UL (ref 135–450)
PMV BLD AUTO: 7.9 FL (ref 5–10.5)
POTASSIUM SERPL-SCNC: 3.9 MMOL/L (ref 3.5–5.1)
PROTEIN UA: NEGATIVE MG/DL
RBC # BLD: 4.35 M/UL (ref 4–5.2)
RBC UA: NORMAL /HPF (ref 0–2)
SODIUM BLD-SCNC: 139 MMOL/L (ref 136–145)
SPECIFIC GRAVITY UA: 1.01 (ref 1–1.03)
SPECIMEN STATUS: NORMAL
TOTAL PROTEIN: 7.6 G/DL (ref 6.4–8.2)
URINE TYPE: ABNORMAL
UROBILINOGEN, URINE: 0.2 E.U./DL
WBC # BLD: 10.3 K/UL (ref 4–11)
WBC UA: NORMAL /HPF (ref 0–5)

## 2019-10-17 PROCEDURE — 84703 CHORIONIC GONADOTROPIN ASSAY: CPT

## 2019-10-17 PROCEDURE — 99283 EMERGENCY DEPT VISIT LOW MDM: CPT

## 2019-10-17 PROCEDURE — 80053 COMPREHEN METABOLIC PANEL: CPT

## 2019-10-17 PROCEDURE — 85025 COMPLETE CBC W/AUTO DIFF WBC: CPT

## 2019-10-17 PROCEDURE — 81001 URINALYSIS AUTO W/SCOPE: CPT

## 2019-10-17 ASSESSMENT — PAIN DESCRIPTION - LOCATION: LOCATION: ABDOMEN

## 2019-10-17 ASSESSMENT — PAIN DESCRIPTION - DESCRIPTORS: DESCRIPTORS: CRAMPING

## 2019-10-17 ASSESSMENT — ENCOUNTER SYMPTOMS
NAUSEA: 0
DIARRHEA: 0
SORE THROAT: 0
SHORTNESS OF BREATH: 0
WHEEZING: 0
RHINORRHEA: 0
VOMITING: 0
TROUBLE SWALLOWING: 0
COUGH: 0
STRIDOR: 0
BACK PAIN: 1
CHEST TIGHTNESS: 0
ABDOMINAL PAIN: 1

## 2019-10-17 ASSESSMENT — PAIN SCALES - GENERAL: PAINLEVEL_OUTOF10: 5

## 2020-01-08 ENCOUNTER — HOSPITAL ENCOUNTER (OUTPATIENT)
Dept: PHYSICAL THERAPY | Age: 25
Setting detail: THERAPIES SERIES
Discharge: HOME | End: 2020-01-08
Payer: MEDICARE

## 2020-01-09 ENCOUNTER — HOSPITAL ENCOUNTER (OUTPATIENT)
Dept: PHYSICAL THERAPY | Age: 25
Setting detail: THERAPIES SERIES
Discharge: HOME | End: 2020-01-09
Payer: MEDICARE

## 2020-01-09 PROCEDURE — 97530 THERAPEUTIC ACTIVITIES: CPT

## 2020-01-09 PROCEDURE — 97162 PT EVAL MOD COMPLEX 30 MIN: CPT

## 2020-01-09 PROCEDURE — 97110 THERAPEUTIC EXERCISES: CPT

## 2020-01-09 NOTE — FLOWSHEET NOTE
week time. Plan: [x] Continue per plan of care [] Alter current plan (see comments)   [] Plan of care initiated [] Hold pending MD visit [] Discharge      Plan for Next Session:  Biomechanical correction of problems as they present. Facilitate correct muscle firing patterns and activation with appropriate activities. Progress patient as tolerated.      Re-Certification Due Date:  Visit 8       Signature:  Nguyen Shankar , PT, DPT

## 2020-01-09 NOTE — PROGRESS NOTES
If you have any questions or concerns, please don't hesitate to call. Thank you for your referral.    Physician Signature:________________________________Date:__________________  By signing above, therapists plan is approved by physician    Please sign and return fax to [phone]    Physical Therapy  Initial Assessment  Date: 2020  Patient Name: Claus Gilbert  MRN: [id number]  : 1995     Treatment Diagnosis: (R) shoulder instability    Restrictions  Restrictions/Precautions  Restrictions/Precautions: General Precautions    Subjective   General  Chart Reviewed: Yes  Patient assessed for rehabilitation services?: Yes  Family / Caregiver Present: No  Referring Practitioner: Myesha Monet MD  Referral Date : 20  Diagnosis: (R) shoulder pain  Follows Commands: Within Functional Limits  General Comment  Comments: PLOF: independent with all ADLs and work without (R) arm pain or dislocation. PT Visit Information  Onset Date: 19  PT Insurance Information: [place]  Subjective  Subjective: Patient reports \"my shoulder comes out of socket and has been doing that for over a year\". Reports she went to see the orthopedic MD who told her she needed surgery, but is required to do therapy first.  Reports most recent time this occured was 2 weeks ago when folding a comforter. Reports \"I only have pain when I pull it out of socket or feel it slip\". States she works as a , has trouble using (R) arm to raise up or \"wipe anything\". States \"if I keep my arm close to my body it doesn't hurt\". Reports occasional tingling in (R) fingers.     Pain Screening  Patient Currently in Pain: Denies(Pain peak in last week: 8/10 Pain at best in last week: 0/10.)  Vital Signs  Patient Currently in Pain: Denies(Pain peak in last week: 8/10 Pain at best in last week: 0/10.)    Vision/Hearing  Vision  Vision: Impaired(glasses/contacts)  Hearing  Hearing: Within functional

## 2020-01-13 ENCOUNTER — HOSPITAL ENCOUNTER (OUTPATIENT)
Dept: PHYSICAL THERAPY | Age: 25
Setting detail: THERAPIES SERIES
Discharge: HOME | End: 2020-01-13
Payer: MEDICARE

## 2020-01-13 PROCEDURE — 97110 THERAPEUTIC EXERCISES: CPT

## 2020-01-13 NOTE — FLOWSHEET NOTE
Physical Therapy Daily Treatment Note    Date:  2020    Patient Name:  Sheila Bañuelos    :  1995  MRN: [id number]  Restrictions/Precautions:  Universal   Medical/Treatment Diagnosis Information:  · Diagnosis: (R) shoulder pain  Insurance/Certification information:  PT Insurance Information: [place]  Physician Information:  Referring Practitioner: Trace Lan MD  Plan of care signed (Y/N):  N, sent  Visit# / total visits:       G-Code (if applicable): Quick Dash:     Medicare Cap (if applicable):  N/a = total amount used, updated 2020    Time in:   8:30am      Timed Treatment: 30 Total Treatment Time:  30  ________________________________________________________________________________________    Pain Level:    /10  SUBJECTIVE:  Pt reports that she has been working on the exercises but doesn't know if they are doing anything yet. OBJECTIVE:     Exercise   w/ instruction/education Resistance/Repetitions Other comments   UBE 1 min F/B           Supine alphabet       Supine scap punch              All 4 sit back             Supine R S' neuro re-ed 5 min iso, MRE 1#   Prone T, W 5x10\" ea    Standing wall push up         Ball rolls on wall Up/down x15  Side/side x15  Circles x15 ea    Rhythmic stabilization  3x30\"                                                       Other Therapeutic Activities:  See education session    Manual Treatments:  --       Modalities:  --    Test/Measurements:  See eval       ASSESSMENT:   Pt tolerated session well. Reported and noticeable fatigue at end of session. Treatment/Activity Tolerance:   [x]Patient tolerated treatment well [] Patient limited by fatique  []Patient limited by pain [] Patient limited by other medical complications  [] Other:     Goals:    Short term goals  Time Frame for Short term goals: 4 weeks  Short term goal 1: patient to be independent with HEP to self-manage symptoms.   Short term goal 2: patient to improve (R)

## 2020-01-16 ENCOUNTER — HOSPITAL ENCOUNTER (OUTPATIENT)
Dept: PHYSICAL THERAPY | Age: 25
Setting detail: THERAPIES SERIES
Discharge: HOME | End: 2020-01-16
Payer: MEDICARE

## 2020-01-16 PROCEDURE — 97110 THERAPEUTIC EXERCISES: CPT

## 2020-01-16 NOTE — FLOWSHEET NOTE
shoulder strength by a least +1/2mm grade. Short term goal 3: patient to improve quick dash score by at least 5 points demonstrate improved participation in everyday activity. Short term goal 4: patient to consistently report no episodes of (R) UE disloaction in a 1 week time. Plan: [x] Continue per plan of care [] Alter current plan (see comments)   [] Plan of care initiated [] Hold pending MD visit [] Discharge      Plan for Next Session:  Biomechanical correction of problems as they present. Facilitate correct muscle firing patterns and activation with appropriate activities. Progress patient as tolerated.      Re-Certification Due Date:  Visit 8       Signature:  Victoria Rizo , PT, DPT

## 2020-01-21 ENCOUNTER — HOSPITAL ENCOUNTER (OUTPATIENT)
Dept: PHYSICAL THERAPY | Age: 25
Setting detail: THERAPIES SERIES
Discharge: HOME | End: 2020-01-21
Payer: MEDICARE

## 2020-01-23 ENCOUNTER — HOSPITAL ENCOUNTER (OUTPATIENT)
Dept: PHYSICAL THERAPY | Age: 25
Setting detail: THERAPIES SERIES
Discharge: HOME | End: 2020-01-23
Payer: MEDICARE

## 2020-01-23 PROCEDURE — 97112 NEUROMUSCULAR REEDUCATION: CPT

## 2020-01-23 PROCEDURE — 97110 THERAPEUTIC EXERCISES: CPT

## 2020-01-23 NOTE — FLOWSHEET NOTE
Physical Therapy Daily Treatment Note    Date:  2020    Patient Name:  Ranjana Hall    :  1995  MRN: [id number]  Restrictions/Precautions:  Universal   Medical/Treatment Diagnosis Information:  · Diagnosis: (R) shoulder pain  Insurance/Certification information:  PT Insurance Information: [place]  Physician Information:  Referring Practitioner: Lucia Ziegler MD  Plan of care signed (Y/N):  N, sent  Visit# / total visits:       G-Code (if applicable): Quick Dash:     Medicare Cap (if applicable):  N/a = total amount used, updated 2020    Time in:   9:00am      Timed Treatment: 30min Total Treatment Time:  30min  ________________________________________________________________________________________    Pain Level:    0/10  SUBJECTIVE:  Patient reports \"I am fine, I don't really see a difference yet\". Reports compliance with HEP. OBJECTIVE:     Exercise   w/ instruction/education Resistance/Repetitions Other comments   UBE 2x1 min F/B           Supine alphabet   x1 2#    Supine scap punch   x           All 4 sit back  x           Supine R S' neuro re-ed 5 min iso, MRE 2#   Prone T, W, Y 5x10\" ea    Standing wall push up         Ball rolls on wall 2# Up/down x15  Side/side x15  Circles x15 ea    Rhythmic stabilization  3x30\"    Prone Pivot   x12           IR/ER walk out    nv                                  Other Therapeutic Activities:  See education session    Manual Treatments:  --       Modalities:  --    Test/Measurements:  See eval       ASSESSMENT:   Patient performed above TE well with no increase in pain. The patient reported fatigue at end of session. No increase pain levels at end of session.       Treatment/Activity Tolerance:   [x]Patient tolerated treatment well [] Patient limited by fatique  []Patient limited by pain [] Patient limited by other medical complications  [] Other:     Goals:    Short term goals  Time Frame for Short term goals: 4 weeks  Short term goal 1: patient to be independent with HEP to self-manage symptoms. Short term goal 2: patient to improve (R) shoulder strength by a least +1/2mm grade. Short term goal 3: patient to improve quick dash score by at least 5 points demonstrate improved participation in everyday activity. Short term goal 4: patient to consistently report no episodes of (R) UE disloaction in a 1 week time. Plan: [x] Continue per plan of care [] Alter current plan (see comments)   [] Plan of care initiated [] Hold pending MD visit [] Discharge      Plan for Next Session:  Biomechanical correction of problems as they present. Facilitate correct muscle firing patterns and activation with appropriate activities. Progress patient as tolerated.      Re-Certification Due Date:  Visit 8       Signature:  Chavo Chance , PT, DPT

## 2020-01-30 ENCOUNTER — HOSPITAL ENCOUNTER (OUTPATIENT)
Dept: PHYSICAL THERAPY | Age: 25
Setting detail: THERAPIES SERIES
Discharge: HOME | End: 2020-01-30
Payer: MEDICARE

## 2020-01-30 PROCEDURE — 97110 THERAPEUTIC EXERCISES: CPT

## 2020-01-30 NOTE — FLOWSHEET NOTE
Physical Therapy Daily Treatment Note    Date:  2020    Patient Name:  Rolando Caceres    :  1995  MRN: [id number]  Restrictions/Precautions:  Universal   Medical/Treatment Diagnosis Information:  · Diagnosis: (R) shoulder pain  Insurance/Certification information:  PT Insurance Information: [place]  Physician Information:  Referring Practitioner: Peyton Solano MD  Plan of care signed (Y/N): Y  Visit# / total visits:       G-Code (if applicable): Quick Dash:     Medicare Cap (if applicable):  N/a = total amount used, updated 2020    Time in:   1:00pm      Timed Treatment: 30min Total Treatment Time:  30min  ________________________________________________________________________________________    Pain Level:    0/10  SUBJECTIVE:  Patient reports \"I feel like therapy might be helping a little bit, my shoulder is stronger but I still feel it slip\". Reports compliance with HEP. OBJECTIVE:     Exercise   w/ instruction/education Resistance/Repetitions Other comments   UBE x2 min F/B           Supine alphabet   x1 2#    Supine scap punch   x           All 4 sit back  x           Supine R S' neuro re-ed 5 min iso, MRE 2#   Prone T, W, Y 5x10\" ea    Standing wall push up         Ball rolls on wall 2# Up/down x15  Side/side x15  Circles x15 ea    Rhythmic stabilization  3x30\"    Prone Pivot (standing)   x5           IR/ER walk out    x10B  Grey tband                                 Other Therapeutic Activities:  See education session    Manual Treatments:  --       Modalities:  --    Test/Measurements:  See eval       ASSESSMENT:   Patient performed above TE well with mild increase in pain with standing prone-pivot, held exercise. The patient continues to report fatigue at end of session. No increase pain levels at end of session.       Treatment/Activity Tolerance:   [x]Patient tolerated treatment well [] Patient limited by fatique  []Patient limited by pain [] Patient limited by other medical complications  [] Other:     Goals:    Short term goals  Time Frame for Short term goals: 4 weeks  Short term goal 1: patient to be independent with HEP to self-manage symptoms. Short term goal 2: patient to improve (R) shoulder strength by a least +1/2mm grade. Short term goal 3: patient to improve quick dash score by at least 5 points demonstrate improved participation in everyday activity. Short term goal 4: patient to consistently report no episodes of (R) UE disloaction in a 1 week time. Plan: [x] Continue per plan of care [] Alter current plan (see comments)   [] Plan of care initiated [] Hold pending MD visit [] Discharge      Plan for Next Session:  Biomechanical correction of problems as they present. Facilitate correct muscle firing patterns and activation with appropriate activities. Progress patient as tolerated.      Re-Certification Due Date:  Visit 8       Signature:  Janie Perdomo , PT, DPT

## 2020-02-04 ENCOUNTER — HOSPITAL ENCOUNTER (OUTPATIENT)
Dept: PHYSICAL THERAPY | Age: 25
Setting detail: THERAPIES SERIES
Discharge: HOME | End: 2020-02-04
Payer: MEDICARE

## 2020-02-04 PROCEDURE — 97110 THERAPEUTIC EXERCISES: CPT

## 2020-02-04 NOTE — FLOWSHEET NOTE
Physical Therapy Daily Treatment Note    Date:  2020    Patient Name:  Jennifer Nice    :  1995  MRN: [id number]  Restrictions/Precautions:  Universal   Medical/Treatment Diagnosis Information:  · Diagnosis: (R) shoulder pain  Insurance/Certification information:  PT Insurance Information: [place]  Physician Information:  Referring Practitioner: Matthew Lincoln MD  Plan of care signed (Y/N): Y  Visit# / total visits:       G-Code (if applicable): Quick Dash:     Medicare Cap (if applicable):  N/a = total amount used, updated 2020    Time in:   9:00am      Timed Treatment: 32min Total Treatment Time:  32min  ________________________________________________________________________________________    Pain Level:    0/10  SUBJECTIVE:  Patient reports \"I had a rough weekend, I popped my shoulder out of socket stretching\". Reports compliance with HEP. OBJECTIVE:     Exercise   w/ instruction/education Resistance/Repetitions Other comments   UBE x2 min F/B           Supine alphabet   x1 2#    Supine scap punch   x           All 4 sit back  x           Supine R S' neuro re-ed 5 min iso, MRE 2#   Prone T, W, Y 5x10\" ea    Standing wall push up         Ball rolls on wall 2# Up/down x15  Side/side x15  Circles x15 ea    Rhythmic stabilization  3x30\"    Prone Pivot (standing)              IR/ER walk out    x10B  Grey tband                                 Other Therapeutic Activities:  See education session    Manual Treatments:  --       Modalities:  --    Test/Measurements:  See eval       ASSESSMENT:   Patient performed above TE well with no increase in pain. The patient continues to report fatigue at end of session. No increase pain levels at end of session. Continue to progress as able.       Treatment/Activity Tolerance:   [x]Patient tolerated treatment well [] Patient limited by fatique  []Patient limited by pain [] Patient limited by other medical complications  [] Other:

## 2020-02-06 ENCOUNTER — HOSPITAL ENCOUNTER (OUTPATIENT)
Dept: PHYSICAL THERAPY | Age: 25
Setting detail: THERAPIES SERIES
Discharge: HOME | End: 2020-02-06
Payer: MEDICARE

## 2020-02-06 PROCEDURE — 97110 THERAPEUTIC EXERCISES: CPT

## 2020-02-06 PROCEDURE — 97112 NEUROMUSCULAR REEDUCATION: CPT

## 2020-02-06 NOTE — FLOWSHEET NOTE
Physical Therapy Daily Treatment Note    Date:  2020    Patient Name:  Onur Wade    :  1995  MRN: [id number]  Restrictions/Precautions:  Universal   Medical/Treatment Diagnosis Information:  · Diagnosis: (R) shoulder pain  Insurance/Certification information:  PT Insurance Information: [place]  Physician Information:  Referring Practitioner: Sandy Phoenix MD  Plan of care signed (Y/N): Y  Visit# / total visits:       G-Code (if applicable): Quick Dash:     Medicare Cap (if applicable):  N/a = total amount used, updated 2020    Time in:   9:00am      Timed Treatment: 32min Total Treatment Time:  32min  ________________________________________________________________________________________    Pain Level:    0/10  SUBJECTIVE:  Patient reports \"I am doing OK, just sore\". \"it's about the same\". Reports compliance with HEP. OBJECTIVE:     Exercise   w/ instruction/education Resistance/Repetitions Other comments   UBE x2 min F/B           Supine alphabet   x1 2#    Supine scap punch   x           All 4 sit back  x           Supine R S' neuro re-ed 5 min iso, MRE 2#   Prone T, W, Y 5x10\" ea    Standing wall push up         Ball rolls on wall 2# Up/down x15  Side/side x15  Circles x15 ea    Rhythmic stabilization  3x30\"    Prone Pivot (standing)              IR/ER walk out    x10B  Grey tband                                 Other Therapeutic Activities:  See education session    Manual Treatments:  --       Modalities:  --    Test/Measurements:  See eval       ASSESSMENT:   Patient performed above TE well with mild increase in discomfort with \"w's\". The patient continues to report fatigue at end of session. No increase pain levels at end of session. Continue to progress as able.       Treatment/Activity Tolerance:   [x]Patient tolerated treatment well [] Patient limited by fatique  []Patient limited by pain [] Patient limited by other medical complications  [] Other:

## 2020-02-11 ENCOUNTER — HOSPITAL ENCOUNTER (OUTPATIENT)
Dept: PHYSICAL THERAPY | Age: 25
Setting detail: THERAPIES SERIES
Discharge: HOME | End: 2020-02-11
Payer: MEDICARE

## 2020-02-13 ENCOUNTER — HOSPITAL ENCOUNTER (OUTPATIENT)
Dept: PHYSICAL THERAPY | Age: 25
Setting detail: THERAPIES SERIES
Discharge: HOME | End: 2020-02-13
Payer: MEDICARE

## 2020-07-09 ENCOUNTER — HOSPITAL ENCOUNTER (OUTPATIENT)
Dept: PHYSICAL THERAPY | Age: 25
Setting detail: THERAPIES SERIES
Discharge: HOME | End: 2020-07-09
Payer: MEDICARE

## 2020-07-09 PROCEDURE — 97140 MANUAL THERAPY 1/> REGIONS: CPT

## 2020-07-09 PROCEDURE — 97162 PT EVAL MOD COMPLEX 30 MIN: CPT

## 2020-07-09 PROCEDURE — 97110 THERAPEUTIC EXERCISES: CPT

## 2020-07-09 NOTE — PROGRESS NOTES
Physical Therapy  Initial Assessment  Date: 2020  Patient Name: Bennett Wade  MRN: [id number]  : 1995     Treatment Diagnosis: (R) UE weakness and decreased (R) UE ROM    Restrictions  Position Activity Restriction  Other position/activity restrictions: see protocol     Subjective   General  Chart Reviewed: Yes  Patient assessed for rehabilitation services?: Yes  Family / Caregiver Present: No  Referring Practitioner: Danita Gallegos MD  Referral Date : 20  Diagnosis: (R) Shoulder Bankart Repair  Follows Commands: Within Functional Limits  Other (Comment): Anterior shoulder protocol  PT Visit Information  Onset Date: 20  PT Insurance Information: [place] - 30 visits  Subjective  Subjective: Patient reports she has dealt with shoulder instability and dislocations for 2 years. Has been through several episodes of phyiscal therapy for shoulder instability. The patient had surgery on (R) shoulder on 2020. Patient has been in sling 24hrs/day. Reports pain has been controlled.     Pain Screening  Patient Currently in Pain: Denies  Vital Signs  Patient Currently in Pain: Denies    Vision/Hearing  Vision  Vision: Within Functional Limits  Hearing  Hearing: Within functional limits    Orientation  Orientation  Overall Orientation Status: Within Functional Limits    Social/Functional History  Social/Functional History  Lives With: Other (comment)(7 kids and boyfriend)  Type of Home: House  Home Layout: One level  Bathroom Shower/Tub: Tub/Shower unit  Bathroom Toilet: Standard  Bathroom Equipment: Hand-held shower  ADL Assistance: Independent  Homemaking Assistance: Independent  Homemaking Responsibilities: Yes  Ambulation Assistance: Independent  Transfer Assistance: Independent  Active : Yes  Mode of Transportation: Car  Occupation: Unemployed    Objective     Observation/Palpation  Posture: Fair(forward guarded posturing of shoulders, rounded IR (B) shoulders)    PROM RUE (degrees)  R Shoulder Flex  0-180: 80*  R Shoulder Ext  0-45: 0*  R Shoulder ABduction 0-180: NT  R Shoulder Int Rotation  0-70: NT  R Shoulder Ext Rotation  0-90: 0*  R Elbow Flex/Ext 0-145: 10*-140*  AROM RUE (degrees)  R Shoulder Flexion 0-180: NT  R Shoulder Extension 0-45: NT  R Shoulder ABduction 0-180: NT  R Shoulder Int Rotation  0-70: NT  R Shoulder Ext Rotation 0-90: NT  R Elbow Flexion 0-145: 10*-140*    Strength RUE  R Shoulder Flexion: NT  R Shoulder Extension: NT  R Shoulder ABduction: NT  R Shoulder Internal Rotation: NT  R Shoulder External Rotation: NT  R Elbow Flexion: NT  R Elbow Extension: NT  R Forearm Pron: NT  R Forearm Sup: NT  Strength LUE  L Shoulder Flexion: 4/5  L Shoulder Extension: 4/5  L Shoulder ABduction: 4/5  L Shoulder Internal Rotation: 4/5  L Shoulder External Rotation: 4/5  L Elbow Flexion: 4/5  L Elbow Extension: 4/5  L Forearm Pron: 4/5  L Forearm Sup: 4/5  Motor Control  Gross Motor?: WFL  Additional Measures  Other: Quick DASH: 42/55  Sensation  Overall Sensation Status: WFL                   Exercises  Exercise 1: see flowsheet                      Assessment   Conditions Requiring Skilled Therapeutic Intervention  Body structures, Functions, Activity limitations: Decreased functional mobility ; Decreased high-level IADLs;Decreased ROM; Decreased strength;Decreased safe awareness;Decreased ADL status; Decreased posture  Assessment: The patient is a 23yo female referred to PT s/p (R) shoulder bankart repair. The patient reports following elements of protocol thus far. The patient demonstrated decreased ROM, strength and overall funcitonal use of (R) UE. The patient demonstrated good motivation and should improve well with PT and HEP compliance.   Treatment Diagnosis: (R) UE weakness and decreased (R) UE ROM  Prognosis: Good  Decision Making: Medium Complexity  REQUIRES PT FOLLOW UP: Yes  Activity Tolerance  Activity Tolerance: Patient Tolerated treatment well         Plan   Plan  Times

## 2020-07-09 NOTE — FLOWSHEET NOTE
equal (L) to improve funcitonal use of (R) UE. Short term goal 4: patient to improve quick DASH score by at least 10 points demonstrating improved participation in everyday activity.            Plan: [x] Continue per plan of care [] Alter current plan (see comments)   [] Plan of care initiated [] Hold pending MD visit [] Discharge      Plan for Next Session:  Follow MD protocol     Re-Certification Due Date:  8/8/2020       Signature:  Marian Mckeon , PT, DPT

## 2020-07-14 ENCOUNTER — HOSPITAL ENCOUNTER (OUTPATIENT)
Dept: PHYSICAL THERAPY | Age: 25
Setting detail: THERAPIES SERIES
Discharge: HOME | End: 2020-07-14
Payer: MEDICARE

## 2020-07-14 NOTE — FLOWSHEET NOTE
Physical Therapy  Cancellation/No-show Note  Patient Name:  Yelena Cerna  :  1995   Date:  2020  Cancels to date: 1  No-shows to date: 0    For today's appointment patient:  [x] Cancelled  [] Rescheduled appointment  [] No-show     Reason given by patient:  [] Patient ill  [x] Conflicting appointment  [] No transportation    [] Conflict with work  [] No reason given  [] Other:     Comments:      Electronically signed by:  Marian Mckeon PT

## 2020-07-16 ENCOUNTER — HOSPITAL ENCOUNTER (OUTPATIENT)
Dept: PHYSICAL THERAPY | Age: 25
Setting detail: THERAPIES SERIES
Discharge: HOME | End: 2020-07-16
Payer: MEDICARE

## 2020-07-16 PROCEDURE — 97140 MANUAL THERAPY 1/> REGIONS: CPT

## 2020-07-16 PROCEDURE — 97110 THERAPEUTIC EXERCISES: CPT

## 2020-07-16 NOTE — FLOWSHEET NOTE
Physical Therapy Daily Treatment Note    Date:  2020    Patient Name:  Rashmi Batres    :  1995  MRN: [id number]  Restrictions/Precautions: universal    Medical/Treatment Diagnosis Information:  · Diagnosis: (R) Shoulder Bankart Repair  Insurance/Certification information:  PT Insurance Information: [place] - 30 visits  Physician Information:  Referring Practitioner: Saray Smith MD  Plan of care signed (Y/N):  N, sent  Visit# / total visits:  2020 visits used prior to eval: 7     G-Code (if applicable): Quick Dash: 42    Medicare Cap (if applicable):  n/a = total amount used, updated 2020    Time in: 3:20pm        Timed Treatment: 40min. Total Treatment Time:  40min. Time out: 4:00pm  ________________________________________________________________________________________    Pain Level:    0/10  SUBJECTIVE:  Patient reports \"I am feeling really pretty good\". \"I honestly want to take this sling off and to be honest I don't keep it on very much, but I always make sure my arm is in neutral\". Reports compliance with HEP. OBJECTIVE:     Exercise/Equipment Resistance/Repetitions Other comments   NuStep (LEs only)  Level 5 x5min           Pendulums   CW/CCW,F/B,S/S x20ea HEP   Supine assisted shoulder flexion  x10 HEP                 Supine elbow Flex/ext: x20 HEP   Supine wrist Flex/ext,sup/pro: x20 HEP          Shoulder isometrics (2 finger) Flex,ext,abd,add x20ea HEP                                                                     Other Therapeutic Activities: --     Manual Treatments:   PROM to (R) shoulder      Modalities:  --    Test/Measurements:  See eval       ASSESSMENT:  The patient is progressing nicely with no pain with protocol ROM or TE. The patient was advised to keep sling on until 4 weeks after  visit (per MD phone call during today's session). Patient verbalized good understanding. No pain reported at end of PT session.

## 2020-07-21 ENCOUNTER — HOSPITAL ENCOUNTER (OUTPATIENT)
Dept: PHYSICAL THERAPY | Age: 25
Setting detail: THERAPIES SERIES
Discharge: HOME | End: 2020-07-21
Payer: MEDICARE

## 2020-07-21 PROCEDURE — 97110 THERAPEUTIC EXERCISES: CPT

## 2020-07-21 PROCEDURE — 97140 MANUAL THERAPY 1/> REGIONS: CPT

## 2020-07-21 NOTE — FLOWSHEET NOTE
Physical Therapy Daily Treatment Note    Date:  2020    Patient Name:  Krystina Gaines    :  1995  MRN: [id number]  Restrictions/Precautions: universal    Medical/Treatment Diagnosis Information:  · Diagnosis: (R) Shoulder Bankart Repair  Insurance/Certification information:  PT Insurance Information: [place] - 30 visits  Physician Information:  Referring Practitioner: Maine Mead MD  Plan of care signed (Y/N):  N, sent  Visit# / total visits:  3/12    2020 visits used prior to eval: 7     Surgery date: 2020    G-Code (if applicable): Quick Dash: 42    Medicare Cap (if applicable):  n/a = total amount used, updated 2020    Time in: 3:35pm        Timed Treatment: 45min. Total Treatment Time:  45min. Time out: 4:20pm  ________________________________________________________________________________________    Pain Level:    0/10  SUBJECTIVE:  Patient reports \"I was doing something with my arm and my arm went off to the right away from my body and it hurt at the time, but feels fine now\". Reports compliance with HEP. OBJECTIVE:     Exercise/Equipment Resistance/Repetitions Other comments   NuStep (LEs only)  Level 5 x5min           Pendulums   CW/CCW,F/B,S/S x20ea HEP   Supine assisted shoulder flexion  x10 HEP                 Supine elbow Flex/ext: x20 HEP   Supine wrist Flex/ext,sup/pro: x20 HEP          Shoulder isometrics (2 finger) Flex,ext,abd,add x20ea HEP                  Scap retraction    nv    Isometrics IR/ER NV                                         Other Therapeutic Activities: --     Manual Treatments:   PROM to (R) shoulder      Modalities:  --    Test/Measurements:  See eval       ASSESSMENT:  The patient is progressing nicely with no pain with protocol ROM or TE. The patient was again remindedto keep sling on until 4 weeks after  visit (per MD). Patient verbalized good understanding. No pain reported at end of PT session.        Treatment/Activity Tolerance:   [x]Patient tolerated treatment well [] Patient limited by fatique  []Patient limited by pain [] Patient limited by other medical complications  [] Other:     Goals:    Short term goals  Time Frame for Short term goals: 6-12 weeks  Short term goal 1: patient to be independent with HEP to self-manage symptoms. Short term goal 2: patient to improve (R) shoulder ROM to equal (L) to improve funcitonal use of (R) UE. Short term goal 3: patient to improve (R) shoulder strength to equal (L) to improve funcitonal use of (R) UE. Short term goal 4: patient to improve quick DASH score by at least 10 points demonstrating improved participation in everyday activity.            Plan: [x] Continue per plan of care [] Alter current plan (see comments)   [] Plan of care initiated [] Hold pending MD visit [] Discharge      Plan for Next Session:  Follow MD protocol     Re-Certification Due Date:  8/8/2020       Signature:  Anthony Subramanian PT, DPT

## 2020-07-23 ENCOUNTER — HOSPITAL ENCOUNTER (OUTPATIENT)
Dept: PHYSICAL THERAPY | Age: 25
Setting detail: THERAPIES SERIES
Discharge: HOME | End: 2020-07-23
Payer: MEDICARE

## 2020-07-23 NOTE — FLOWSHEET NOTE
Physical Therapy  Cancellation/No-show Note  Patient Name:  Gissell Arvizu  :  1995   Date:  2020  Cancels to date: 1  No-shows to date: 1    For today's appointment patient:  [] Cancelled  [] Rescheduled appointment  [] No-show     Reason given by patient:  [] Patient ill  [] Conflicting appointment  [] No transportation    [] Conflict with work  [] No reason given  [x] Other:     Comments: PT called patient at 12:50pm; patient reported \"I forgot, I'm sorry\".       Electronically signed by:  Gary Nava PT

## 2020-07-28 ENCOUNTER — HOSPITAL ENCOUNTER (OUTPATIENT)
Dept: PHYSICAL THERAPY | Age: 25
Setting detail: THERAPIES SERIES
Discharge: HOME | End: 2020-07-28
Payer: MEDICARE

## 2020-07-28 PROCEDURE — 97110 THERAPEUTIC EXERCISES: CPT

## 2020-07-28 PROCEDURE — 97140 MANUAL THERAPY 1/> REGIONS: CPT

## 2020-07-28 NOTE — FLOWSHEET NOTE
Physical Therapy Daily Treatment Note    Date:  2020    Patient Name:  Lilly Duane    :  1995  MRN: [id number]  Restrictions/Precautions: universal    Medical/Treatment Diagnosis Information:  · Diagnosis: (R) Shoulder Bankart Repair  Insurance/Certification information:  PT Insurance Information: [place] - 30 visits  Physician Information:  Referring Practitioner: Coral Read MD  Plan of care signed (Y/N):  N, sent  Visit# / total visits:  2020 visits used prior to eval: 7     Surgery date: 2020    G-Code (if applicable): Quick Dash: 42    Medicare Cap (if applicable):  n/a = total amount used, updated 2020    Time in: 3:30pm        Timed Treatment: 55min. Total Treatment Time:  55min. Time out: 4:25pm  ________________________________________________________________________________________    Pain Level:    0/10  SUBJECTIVE:  Patient reports \"I am doing okay today, I just want to take my sling off\". \"is it bad if I was carrying groceries with this arm? \". Reports compliance with HEP. OBJECTIVE:     Exercise/Equipment Resistance/Repetitions Other comments   NuStep (LEs only)  Level 5 x5min           Pendulums   CW/CCW,F/B,S/S x20ea HEP   Supine assisted shoulder flexion  x10 HEP                 Supine elbow Flex/ext: x20 HEP   Supine wrist Flex/ext,sup/pro: x20 HEP          Shoulder isometrics (2 finger) Flex,ext,abd,add x20ea HEP                  Scap retraction/protraction   x20    Isometrics IR/ER x20                                         Other Therapeutic Activities: --     Manual Treatments:   PROM to (R) shoulder      Modalities:  --    Test/Measurements:  See eval       ASSESSMENT:  The patient is progressing nicely with no pain with protocol ROM or TE.  The patient was again reminded to keep sling on until 4 weeks after  visit (per MD) and the protocol was again reviewed with the patient, specifically to keep the sling on and to not use surgical arm for lifting groceries ect. Patient verbalized good understanding. No pain reported at end of PT session. Treatment/Activity Tolerance:   [x]Patient tolerated treatment well [] Patient limited by fatique  []Patient limited by pain [] Patient limited by other medical complications  [] Other:     Goals:    Short term goals  Time Frame for Short term goals: 6-12 weeks  Short term goal 1: patient to be independent with HEP to self-manage symptoms. Short term goal 2: patient to improve (R) shoulder ROM to equal (L) to improve funcitonal use of (R) UE. Short term goal 3: patient to improve (R) shoulder strength to equal (L) to improve funcitonal use of (R) UE. Short term goal 4: patient to improve quick DASH score by at least 10 points demonstrating improved participation in everyday activity.            Plan: [x] Continue per plan of care [] Alter current plan (see comments)   [] Plan of care initiated [] Hold pending MD visit [] Discharge      Plan for Next Session:  Follow MD protocol     Re-Certification Due Date:  8/8/2020       Signature:  Jean-Pierre Farley , PT, DPT

## 2020-07-30 ENCOUNTER — APPOINTMENT (OUTPATIENT)
Dept: PHYSICAL THERAPY | Age: 25
End: 2020-07-30
Payer: MEDICARE

## 2020-08-04 ENCOUNTER — HOSPITAL ENCOUNTER (OUTPATIENT)
Dept: PHYSICAL THERAPY | Age: 25
Setting detail: THERAPIES SERIES
Discharge: HOME | End: 2020-08-04
Payer: MEDICARE

## 2020-08-06 ENCOUNTER — HOSPITAL ENCOUNTER (OUTPATIENT)
Dept: PHYSICAL THERAPY | Age: 25
Setting detail: THERAPIES SERIES
Discharge: HOME | End: 2020-08-06
Payer: MEDICARE

## 2020-08-06 PROCEDURE — 97140 MANUAL THERAPY 1/> REGIONS: CPT

## 2020-08-06 PROCEDURE — 97110 THERAPEUTIC EXERCISES: CPT

## 2020-08-06 NOTE — FLOWSHEET NOTE
No pain reported at end of PT session. Treatment/Activity Tolerance:   [x]Patient tolerated treatment well [] Patient limited by fatique  []Patient limited by pain [] Patient limited by other medical complications  [] Other:     Goals:    Short term goals  Time Frame for Short term goals: 6-12 weeks  Short term goal 1: patient to be independent with HEP to self-manage symptoms. Short term goal 2: patient to improve (R) shoulder ROM to equal (L) to improve funcitonal use of (R) UE. Short term goal 3: patient to improve (R) shoulder strength to equal (L) to improve funcitonal use of (R) UE. Short term goal 4: patient to improve quick DASH score by at least 10 points demonstrating improved participation in everyday activity.            Plan: [x] Continue per plan of care [] Alter current plan (see comments)   [] Plan of care initiated [] Hold pending MD visit [] Discharge      Plan for Next Session:  Follow MD protocol     Re-Certification Due Date:  8/8/2020       Signature:  Jean-Pierre Farley , PT, DPT

## 2020-08-11 ENCOUNTER — HOSPITAL ENCOUNTER (OUTPATIENT)
Dept: PHYSICAL THERAPY | Age: 25
Setting detail: THERAPIES SERIES
Discharge: HOME | End: 2020-08-11
Payer: MEDICARE

## 2020-08-11 PROCEDURE — 97140 MANUAL THERAPY 1/> REGIONS: CPT

## 2020-08-11 PROCEDURE — 97110 THERAPEUTIC EXERCISES: CPT

## 2020-08-11 NOTE — FLOWSHEET NOTE
Physical Therapy Daily Treatment Note    Date:  2020    Patient Name:  Juanita Glass    :  1995  MRN: [id number]  Restrictions/Precautions: universal    Medical/Treatment Diagnosis Information:  · Diagnosis: (R) Shoulder Bankart Repair  Insurance/Certification information:  PT Insurance Information: [place] - 30 visits  Physician Information:  Referring Practitioner: Jovi Coto MD  Plan of care signed (Y/N):  N, sent  Visit# / total visits:  2020 visits used prior to eval: 7     Surgery date: 2020    G-Code (if applicable): Quick Dash: 42    Medicare Cap (if applicable):  n/a = total amount used, updated 2020    Time in: 3:30pm        Timed Treatment: 40min. Total Treatment Time:  40min. Time out: 4:10pm  ________________________________________________________________________________________    Pain Level:    0/10  SUBJECTIVE:  Patient reports \"I was sitting in my chair and a moth came by and I accidentally swatted at the moth with this arm and it hurt, but I put my sling on and it feels fine today\". Reports compliance with HEP. OBJECTIVE:     Exercise/Equipment Resistance/Repetitions Other comments   NuStep (LEs only)  Level 5 x5min           Pendulums   CW/CCW,F/B,S/S x20ea HEP   Supine assisted shoulder flexion  x10 HEP                 Supine elbow Flex/ext: x20 HEP   Supine wrist Flex/ext,sup/pro: x20 HEP          Shoulder isometrics (2 finger) Flex,ext,abd,add x20ea HEP                  Scap retraction/protraction   x20    Isometrics IR/ER x20           Pulleys   x2min (flexion)    Hip 4 way with tband   x10ea (B) HEP                   Other Therapeutic Activities: --     Manual Treatments:   PROM to (R) shoulder      Modalities:  --    Test/Measurements:  See eval       ASSESSMENT:  The patient is progressing nicely with no pain per protocol ROM or TE. Will continue to progress as protocol allows. No pain reported at end of PT session. Treatment/Activity Tolerance:   [x]Patient tolerated treatment well [] Patient limited by fatique  []Patient limited by pain [] Patient limited by other medical complications  [] Other:     Goals:    Short term goals  Time Frame for Short term goals: 6-12 weeks  Short term goal 1: patient to be independent with HEP to self-manage symptoms. Short term goal 2: patient to improve (R) shoulder ROM to equal (L) to improve funcitonal use of (R) UE. Short term goal 3: patient to improve (R) shoulder strength to equal (L) to improve funcitonal use of (R) UE. Short term goal 4: patient to improve quick DASH score by at least 10 points demonstrating improved participation in everyday activity.            Plan: [x] Continue per plan of care [] Alter current plan (see comments)   [] Plan of care initiated [] Hold pending MD visit [] Discharge      Plan for Next Session:  Follow MD protocol     Re-Certification Due Date:  8/8/2020       Signature:  Jacob Schuster , PT, DPT

## 2020-08-13 ENCOUNTER — HOSPITAL ENCOUNTER (OUTPATIENT)
Dept: PHYSICAL THERAPY | Age: 25
Setting detail: THERAPIES SERIES
Discharge: HOME | End: 2020-08-13
Payer: MEDICARE

## 2020-08-13 PROCEDURE — 97110 THERAPEUTIC EXERCISES: CPT

## 2020-08-13 PROCEDURE — 97140 MANUAL THERAPY 1/> REGIONS: CPT

## 2020-08-13 NOTE — FLOWSHEET NOTE
Physical Therapy Daily Treatment Note    Date:  2020    Patient Name:  Lilly Duane    :  1995  MRN: [id number]  Restrictions/Precautions: universal    Medical/Treatment Diagnosis Information:  · Diagnosis: (R) Shoulder Bankart Repair  Insurance/Certification information:  PT Insurance Information: [place] - 30 visits  Physician Information:  Referring Practitioner: Coral Read MD  Plan of care signed (Y/N):  N, sent  Visit# / total visits:  2020 visits used prior to eval: 7     Surgery date: 2020    G-Code (if applicable): Quick Dash: 42    Medicare Cap (if applicable):  n/a = total amount used, updated 2020    Time in: 1:15pm        Timed Treatment: 40min. Total Treatment Time:  40min. Time out: 1:55pm  ________________________________________________________________________________________    Pain Level:    0/10  SUBJECTIVE:  Patient reports \"I am doing really good today\". No new complaints. Reports compliance with HEP. OBJECTIVE:     Exercise/Equipment Resistance/Repetitions Other comments   NuStep (LEs only)  Level 5 x5min           Pendulums   CW/CCW,F/B,S/S x20ea HEP   Supine assisted shoulder flexion  x10 HEP                 Supine elbow Flex/ext: x20 HEP   Supine wrist Flex/ext,sup/pro: x20 HEP          Shoulder isometrics (2 finger) Flex,ext,abd,add x20ea HEP                  Scap retraction/protraction   x20    Isometrics IR/ER x20           Pulleys   x2min (flexion)    Hip 3 way with tband    HEP   Hip ABD + EXT  25# x20B  45# x20B             Other Therapeutic Activities: --     Manual Treatments:   PROM to (R) shoulder      Modalities:  --     Test/Measurements:  See eval       ASSESSMENT:  The patient is progressing nicely with no pain per protocol ROM or TE. Will continue to progress as protocol allows. No pain reported at end of PT session. Patient will be 7 weeks post-op at next visit.       Treatment/Activity Tolerance:   [x]Patient tolerated treatment well [] Patient limited by fatique  []Patient limited by pain [] Patient limited by other medical complications  [] Other:     Goals:    Short term goals  Time Frame for Short term goals: 6-12 weeks  Short term goal 1: patient to be independent with HEP to self-manage symptoms. Short term goal 2: patient to improve (R) shoulder ROM to equal (L) to improve funcitonal use of (R) UE. Short term goal 3: patient to improve (R) shoulder strength to equal (L) to improve funcitonal use of (R) UE. Short term goal 4: patient to improve quick DASH score by at least 10 points demonstrating improved participation in everyday activity.            Plan: [x] Continue per plan of care [] Alter current plan (see comments)   [] Plan of care initiated [] Hold pending MD visit [] Discharge      Plan for Next Session:  Follow MD protocol     Re-Certification Due Date:  8/8/2020       Signature:  Darryle Brier , PT, DPT

## 2020-08-18 ENCOUNTER — HOSPITAL ENCOUNTER (OUTPATIENT)
Dept: PHYSICAL THERAPY | Age: 25
Setting detail: THERAPIES SERIES
Discharge: HOME | End: 2020-08-18
Payer: MEDICARE

## 2020-08-18 PROCEDURE — 97140 MANUAL THERAPY 1/> REGIONS: CPT

## 2020-08-18 PROCEDURE — 97110 THERAPEUTIC EXERCISES: CPT

## 2020-08-18 NOTE — FLOWSHEET NOTE
reported at end of PT session. The current protocol and precautions were reviewed with patient and she verbalized good understanding. Treatment/Activity Tolerance:   [x]Patient tolerated treatment well [] Patient limited by fatique  []Patient limited by pain [] Patient limited by other medical complications  [] Other:     Goals:    Short term goals  Time Frame for Short term goals: 6-12 weeks  Short term goal 1: patient to be independent with HEP to self-manage symptoms. Short term goal 2: patient to improve (R) shoulder ROM to equal (L) to improve funcitonal use of (R) UE. Short term goal 3: patient to improve (R) shoulder strength to equal (L) to improve funcitonal use of (R) UE. Short term goal 4: patient to improve quick DASH score by at least 10 points demonstrating improved participation in everyday activity.            Plan: [x] Continue per plan of care [] Alter current plan (see comments)   [] Plan of care initiated [] Hold pending MD visit [] Discharge      Plan for Next Session:  Follow MD protocol     Re-Certification Due Date:  8/8/2020       Signature:  Tesha Palumbo , PT, DPT

## 2020-08-20 ENCOUNTER — HOSPITAL ENCOUNTER (OUTPATIENT)
Dept: PHYSICAL THERAPY | Age: 25
Setting detail: THERAPIES SERIES
Discharge: HOME | End: 2020-08-20
Payer: MEDICARE

## 2020-08-20 PROCEDURE — 97110 THERAPEUTIC EXERCISES: CPT

## 2020-08-20 PROCEDURE — 97140 MANUAL THERAPY 1/> REGIONS: CPT

## 2020-08-20 NOTE — FLOWSHEET NOTE
Physical Therapy Daily Treatment Note    Date:  2020    Patient Name:  Jatinder Story    :  1995  MRN: [id number]  Restrictions/Precautions: universal    Medical/Treatment Diagnosis Information:  · Diagnosis: (R) Shoulder Bankart Repair  Insurance/Certification information:  PT Insurance Information: [place] - 30 visits  Physician Information:  Referring Practitioner: Julián Castañeda MD  Plan of care signed (Y/N):  N, sent  Visit# / total visits:  2020 visits used prior to eval: 7     Surgery date: 2020    G-Code (if applicable): Quick Dash: 42    Medicare Cap (if applicable):  n/a = total amount used, updated 2020    Time in: 3:35pm        Timed Treatment: 40min. Total Treatment Time:  40min. Time out: 4:15pm  ________________________________________________________________________________________    Pain Level:    0/10  SUBJECTIVE:  Patient reports \"I am doing good, my shoulder is popping though without any reason, it just pops, I don't even have to be doing anything\". Reports compliance with HEP.     OBJECTIVE:     Exercise/Equipment Resistance/Repetitions Other comments       UBE  x2min f/b    Pendulums   CW/CCW,F/B,S/S x20ea HEP   HEP   Cane flexion AROM   x10           Supine elbow Flex/ext: x20 HEP   Supine wrist Flex/ext,sup/pro: x20 HEP          Shoulder isometrics (2 finger) Flex,ext,abd,add x20ea HEP   Scap re-ed (supine)    x10 no weight    Scap protraction   x20 (R)    Scap retraction  x20        Prone Rows x20    Prone flexion (60-90*) x20         Wall ball x10ea (purple un-weighted ball)    Standing shoulder flexion/scaption x20ea (R)           Pulleys   x2min (flexion)    Hip 3 way with tband    HEP   Hip ABD + EXT               Other Therapeutic Activities: --     Manual Treatments:   PROM to (R) shoulder, rhythmic stabilization @90*    Modalities:  --     Test/Measurements:  See eval       ASSESSMENT:  The patient is progressing nicely with no pain per

## 2020-08-25 ENCOUNTER — HOSPITAL ENCOUNTER (OUTPATIENT)
Dept: PHYSICAL THERAPY | Age: 25
Setting detail: THERAPIES SERIES
Discharge: HOME | End: 2020-08-25
Payer: MEDICARE

## 2020-08-25 PROCEDURE — 97140 MANUAL THERAPY 1/> REGIONS: CPT

## 2020-08-25 PROCEDURE — 97110 THERAPEUTIC EXERCISES: CPT

## 2020-08-25 NOTE — FLOWSHEET NOTE
Physical Therapy Daily Treatment Note    Date:  2020    Patient Name:  Juanita Glass    :  1995  MRN: [id number]  Restrictions/Precautions: universal    Medical/Treatment Diagnosis Information:  · Diagnosis: (R) Shoulder Bankart Repair  Insurance/Certification information:  PT Insurance Information: [place] - 30 visits  Physician Information:  Referring Practitioner: Jovi Coto MD  Plan of care signed (Y/N):  N, sent  Visit# / total visits:  10/12    2020 visits used prior to eval: 7     Surgery date: 2020    G-Code (if applicable): Quick Dash: 42    Medicare Cap (if applicable):  n/a = total amount used, updated 2020    Time in: 3:30pm        Timed Treatment: 40min. Total Treatment Time:  40min. Time out: 4:10pm  ________________________________________________________________________________________    Pain Level:    0/10  SUBJECTIVE:  Patient reports \"I am doing really good\". Reports compliance with HEP. OBJECTIVE:     Exercise/Equipment Resistance/Repetitions Other comments      UBE  x2.5min f/b    Pendulums   CW/CCW,F/B,S/S x20ea HEP   HEP   Cane flexion AROM   x10           Supine elbow Flex/ext: x20 HEP   Supine wrist Flex/ext,sup/pro: x20 HEP          Shoulder isometrics (2 finger) Flex,ext,abd,add x20ea HEP   Scap re-ed (supine)    x3min 1#    Scap protraction   x20 (R)    Scap retraction  x20        Prone Rows x20    Prone flexion (60-90*) x20         Wall ball x10ea (purple un-weighted ball)    Standing shoulder flexion/scaption x20ea (R) 1#    Elbow flex/ext   x20 (R) 1#    Pulleys   x2min (flexion)    Hip 3 way with tband    HEP   Hip ABD + EXT               Other Therapeutic Activities: --     Manual Treatments:   PROM to (R) shoulder, rhythmic stabilization @90*    Modalities:  --     Test/Measurements:  See eval       ASSESSMENT:  The patient is progressing nicely with no pain per protocol ROM or TE. Will continue to progress as protocol allows.   No pain reported at end of PT session, patient stated \"it feels good, it is just tired\". Encouraged patient to continue with HEP compliance. Treatment/Activity Tolerance:   [x]Patient tolerated treatment well [] Patient limited by fatique  []Patient limited by pain [] Patient limited by other medical complications  [] Other:     Goals:    Short term goals  Time Frame for Short term goals: 6-12 weeks  Short term goal 1: patient to be independent with HEP to self-manage symptoms. Short term goal 2: patient to improve (R) shoulder ROM to equal (L) to improve funcitonal use of (R) UE. Short term goal 3: patient to improve (R) shoulder strength to equal (L) to improve funcitonal use of (R) UE. Short term goal 4: patient to improve quick DASH score by at least 10 points demonstrating improved participation in everyday activity.            Plan: [x] Continue per plan of care [] Alter current plan (see comments)   [] Plan of care initiated [] Hold pending MD visit [] Discharge      Plan for Next Session:  Follow MD protocol     Re-Certification Due Date:  Visit 12    Signature:  Liz Nova , PT, DPT

## 2020-08-27 ENCOUNTER — APPOINTMENT (OUTPATIENT)
Dept: PHYSICAL THERAPY | Age: 25
End: 2020-08-27
Payer: MEDICARE

## 2020-09-01 ENCOUNTER — HOSPITAL ENCOUNTER (OUTPATIENT)
Dept: PHYSICAL THERAPY | Age: 25
Setting detail: THERAPIES SERIES
Discharge: HOME | End: 2020-09-01
Payer: MEDICARE

## 2020-09-01 PROCEDURE — 97110 THERAPEUTIC EXERCISES: CPT

## 2020-09-01 PROCEDURE — 97140 MANUAL THERAPY 1/> REGIONS: CPT

## 2020-09-01 NOTE — FLOWSHEET NOTE
Physical Therapy Daily Treatment Note    Date:  2020    Patient Name:  Alex Robin    :  1995  MRN: [id number]  Restrictions/Precautions: universal    Medical/Treatment Diagnosis Information:  · Diagnosis: (R) Shoulder Bankart Repair  Insurance/Certification information:  PT Insurance Information: [place] - 30 visits  Physician Information:  Referring Practitioner: Molina Edwards MD  Plan of care signed (Y/N):  N, sent  Visit# / total visits:  2020 visits used prior to eval: 7     Surgery date: 2020    G-Code (if applicable): Quick Dash: 42    Medicare Cap (if applicable):  n/a = total amount used, updated 2020    Time in: 4:15pm        Timed Treatment: 40min. Total Treatment Time:  40min. Time out: 4:55pm  ________________________________________________________________________________________    Pain Level:    0/10  SUBJECTIVE:  Patient reports \"I am doing good and it is feeling good\". Reports compliance with HEP.     OBJECTIVE:     Exercise/Equipment Resistance/Repetitions Other comments      UBE  x2.5min f/b    Pendulums   CW/CCW,F/B,S/S x20ea HEP   HEP   Cane flexion AROM   x10           Supine elbow Flex/ext: x20 HEP   Supine wrist Flex/ext,sup/pro: x20 HEP          Shoulder isometrics (2 finger) Flex,ext,abd,add x20ea HEP   Scap re-ed (supine)    x3min 2#    Supine Scap protraction   x12 (R) 2#    Scap retraction  x20        Prone Rows x20    Prone flexion (60-90*) x20    Supine alphabet x1 2#                   Wall ball x10ea (purple un-weighted ball)    Standing shoulder flexion/scaption x20ea (R) 1#    Elbow flex/ext   x20 (R) 1#    Pulleys   x2min (flexion)    Hip 3 way with tband    HEP   Hip ABD + EXT      Wall push up x12                       Other Therapeutic Activities: --     Manual Treatments:   PROM to (R) shoulder, rhythmic stabilization @90*    Modalities:  --     Test/Measurements:  See eval       ASSESSMENT:  The patient is progressing nicely with no pain per protocol ROM or TE. Will continue to progress as protocol allows. No pain reported at end of PT session, patient stated \"it is just tired, I am excited to start using it more\". Encouraged patient to continue with HEP compliance. Treatment/Activity Tolerance:   [x]Patient tolerated treatment well [] Patient limited by fatique  []Patient limited by pain [] Patient limited by other medical complications  [] Other:     Goals:    Short term goals  Time Frame for Short term goals: 6-12 weeks  Short term goal 1: patient to be independent with HEP to self-manage symptoms. Short term goal 2: patient to improve (R) shoulder ROM to equal (L) to improve funcitonal use of (R) UE. Short term goal 3: patient to improve (R) shoulder strength to equal (L) to improve funcitonal use of (R) UE. Short term goal 4: patient to improve quick DASH score by at least 10 points demonstrating improved participation in everyday activity.            Plan: [x] Continue per plan of care [] Alter current plan (see comments)   [] Plan of care initiated [] Hold pending MD visit [] Discharge      Plan for Next Session:  Follow MD protocol     Re-Certification Due Date:  Visit 12    Signature:  Shirley Baeza , PT, DPT

## 2020-09-08 ENCOUNTER — HOSPITAL ENCOUNTER (OUTPATIENT)
Dept: PHYSICAL THERAPY | Age: 25
Setting detail: THERAPIES SERIES
Discharge: HOME | End: 2020-09-08
Payer: MEDICARE

## 2020-09-08 PROCEDURE — 97110 THERAPEUTIC EXERCISES: CPT

## 2020-09-08 PROCEDURE — 97140 MANUAL THERAPY 1/> REGIONS: CPT

## 2020-09-08 NOTE — FLOWSHEET NOTE
Physical Therapy Daily Treatment and Re-assessment Note    Date:  2020    Patient Name:  Rashmi Batres    :  1995  MRN: [id number]     Restrictions/Precautions: 10 weeks post-op TODAY  Medical/Treatment Diagnosis Information:  · Diagnosis: (R) Shoulder Bankart Repair  Insurance/Certification information:  PT Insurance Information: [place] - 30 visits  Physician Information:  Referring Practitioner: Saray Smith MD  Plan of care signed (Y/N):  N, sent  Visit# / total visits:  2020 visits used prior to eval: 7     Surgery date: 2020    G-Code (if applicable): Quick Dash:  (was )    Medicare Cap (if applicable):  n/a = total amount used, updated 2020    Time in: 4:15pm        Timed Treatment: 53min. Total Treatment Time:  53min. Time out: 5:08pm  ________________________________________________________________________________________    Pain Level:    0/10  SUBJECTIVE:  Patient reports \"I think things are going good\". NO complaints of pain. Reports \"I still don't use my shoulder for big things like washing windows or folding blankets, but everyday stuff I feel okay with it\". Reports compliance with HEP.     OBJECTIVE:     Exercise/Equipment Resistance/Repetitions Other comments      UBE  x2.5min f/b    Pendulums   CW/CCW,F/B,S/S x20ea HEP   HEP   Cane flexion AROM   x10           Supine elbow Flex/ext: x20 HEP   Supine wrist Flex/ext,sup/pro: x20 HEP          Shoulder isometrics (2 finger) Flex,ext,abd,add x20ea HEP   Scap re-ed (supine)    x3min 2#    Supine Scap protraction   x12 (R) 2#    Scap retraction  x20        Prone Rows x20 1#    Prone flexion (60-90*) x20 1#    Supine alphabet x1 2#    All 4 stabilization  x10              Wall ball x10ea (2#)    Standing shoulder flexion/scaption x20ea (R) 2#    Elbow flex/ext   x20 (R) 2#    Pulleys   x2min (flexion)  x2min (scaption)    Hip 3 way with tband    HEP   Hip ABD + EXT      Wall push up x12 Other Therapeutic Activities: --     Manual Treatments:   PROM to (R) shoulder, rhythmic stabilization @90*    Modalities:  --     Test/Measurements:    PROM RUE (degrees)   R Shoulder Flex  0-180: 165* (was 80*)   R Shoulder Ext  0-45: 45* (was 0*)   R Shoulder ABduction 0-180: 180* (was NT)   R Shoulder Int Rotation  0-70: 60* (was NT)   R Shoulder Ext Rotation  0-90: 80* (was 0*)   R Elbow Flex/Ext 0-145: 10*-140*  AROM RUE (degrees)   R Shoulder Flexion 0-180: 150* (was NT)   R Shoulder Extension 0-45: 45* (was NT)   R Shoulder ABduction 0-180: 135* (was NT)   R Shoulder Int Rotation  0-70: 60* (was NT)   R Shoulder Ext Rotation 0-90: 80* (was NT)   R Elbow Flexion 0-145: 10*-140*    Strength RUE   R Shoulder Flexion: 4-/5 (was NT)   R Shoulder Extension: 4-/5 (was NT)   R Shoulder ABduction: 4-/5 (was NT)   R Shoulder Internal Rotation: 4-/5 (was NT)   R Shoulder External Rotation: 4-/5 (was NT)   R Elbow Flexion:4-/5 (was NT)   R Elbow Extension: 4-/5 (was NT)   R Forearm Pron: 4-/5 (was NT)   R Forearm Sup: 4-/5 (was NT)  Strength LUE   L Shoulder Flexion: 4/5   L Shoulder Extension: 4/5   L Shoulder ABduction: 4/5   L Shoulder Internal Rotation: 4/5   L Shoulder External Rotation: 4/5   L Elbow Flexion: 4/5   L Elbow Extension: 4/5   L Forearm Pron: 4/5   L Forearm Sup: 4/5       Other: Quick DASH: 13/55 (was 42/55)    ASSESSMENT:  After 12 PT visits, the patient is demonstrating good progress toward goals. Demonstrated improved ROM, improved strength, and improved participation in everyday activity. The patient is progressing nicely with no pain per protocol ROM or TE. Will continue to progress as protocol allows. No pain reported at end of PT session. Encouraged patient to continue with HEP compliance. Would benefit from continued PT at 1x/week to further improve ROM and strength and overall use of (R) UE.       Treatment/Activity Tolerance:   [x]Patient tolerated treatment well [] Patient limited by fatique  []Patient limited by pain [] Patient limited by other medical complications  [] Other:     Goals:    Short term goals  Time Frame for Short term goals: 6-12 weeks  Short term goal 1: patient to be independent with HEP to self-manage symptoms. Short term goal 2: patient to improve (R) shoulder ROM to equal (L) to improve funcitonal use of (R) UE. Short term goal 3: patient to improve (R) shoulder strength to equal (L) to improve funcitonal use of (R) UE. Short term goal 4: patient to improve quick DASH score by at least 10 points demonstrating improved participation in everyday activity. Plan: [x] Continue per plan of care [] Alter current plan (see comments)   [] Plan of care initiated [] Hold pending MD visit [] Discharge      Plan for Next Session:  Follow MD protocol     Re-Certification Due Date:  TODAY    Requesting 4 more PT visits at 1x/week. Signature:  Lenny Bond , PT, DPT      If you have any questions or concerns, please don't hesitate to call.   Thank you for your referral.    Physician Signature:________________________________Date:__________________  By signing above, therapists plan is approved by physician    Please sign and return to [phone]

## 2020-09-17 ENCOUNTER — HOSPITAL ENCOUNTER (OUTPATIENT)
Dept: PHYSICAL THERAPY | Age: 25
Setting detail: THERAPIES SERIES
Discharge: HOME | End: 2020-09-17
Payer: MEDICARE

## 2020-09-17 PROCEDURE — 97110 THERAPEUTIC EXERCISES: CPT

## 2020-09-17 PROCEDURE — 97140 MANUAL THERAPY 1/> REGIONS: CPT

## 2020-09-17 NOTE — FLOWSHEET NOTE
Physical Therapy Daily Treatment Note    Date:  2020    Patient Name:  Tere Catalan    :  1995  MRN: [id number]     Restrictions/Precautions: 10 weeks post-op TODAY  Medical/Treatment Diagnosis Information:  · Diagnosis: (R) Shoulder Bankart Repair  Insurance/Certification information:  PT Insurance Information: [place] - 30 visits  Physician Information:  Referring Practitioner: Viv Guzmán MD  Plan of care signed (Y/N):  Y  Visit# / total visits:   , 2020 visits used prior to eval: 7     Surgery date: 2020    G-Code (if applicable): Quick Dash:  (was )    Medicare Cap (if applicable):  n/a = total amount used, updated 2020    Time in: 12:30pm        Timed Treatment: 43min. Total Treatment Time:  43min. Time out: 1:13pm  ________________________________________________________________________________________    Pain Level:    0/10  SUBJECTIVE:  Patient reports \"I think things are going good, I am not having any pain or anything\". Reports compliance with HEP.     OBJECTIVE:     Exercise/Equipment Resistance/Repetitions Other comments      UBE  x2.5min f/b    Pendulums    HEP   HEP   Cane flexion AROM              Supine elbow Flex/ext: x20 HEP   Supine wrist Flex/ext,sup/pro: x20 HEP          Shoulder isometrics (2 finger)  HEP   Scap re-ed (supine)    x3min 2#    Supine Scap protraction   x15 (R) 2#    Scap retraction  x20        Prone Rows x20 2#    Prone flexion (60-90*) x20 2#    Supine alphabet x1 2#    All 4 stabilization  x10    Prone T's, W's, Y's x10 3sec hold         Wall ball x10ea (2#)    Standing shoulder flexion/scaption     Elbow flex/ext       Pulleys   x2min (flexion)  x2min (scaption)    Hip 3 way with tband    HEP   Hip ABD + EXT      Wall push up x12                       Other Therapeutic Activities: --     Manual Treatments:   PROM to (R) shoulder, rhythmic stabilization @90*    Modalities:  --     Test/Measurements:  See re-eval from 9/8/2020      ASSESSMENT:  The patient performed above TE well with no increase in pain. The patient is progressing nicely with no pain per protocol ROM or TE. Will continue to progress as protocol allows. No pain reported at end of PT session. Encouraged patient to continue with HEP compliance. Treatment/Activity Tolerance:   [x]Patient tolerated treatment well [] Patient limited by fatique  []Patient limited by pain [] Patient limited by other medical complications  [] Other:     Goals:    Short term goals  Time Frame for Short term goals: 6-12 weeks  Short term goal 1: patient to be independent with HEP to self-manage symptoms. Short term goal 2: patient to improve (R) shoulder ROM to equal (L) to improve funcitonal use of (R) UE. Short term goal 3: patient to improve (R) shoulder strength to equal (L) to improve funcitonal use of (R) UE. Short term goal 4: patient to improve quick DASH score by at least 10 points demonstrating improved participation in everyday activity.            Plan: [x] Continue per plan of care [] Alter current plan (see comments)   [] Plan of care initiated [] Hold pending MD visit [] Discharge      Plan for Next Session:  Follow MD protocol     Re-Certification Due Date:  Visit 16      Signature:  Júnior Hutchinson , PT, DPT

## 2020-09-22 ENCOUNTER — HOSPITAL ENCOUNTER (OUTPATIENT)
Dept: PHYSICAL THERAPY | Age: 25
Setting detail: THERAPIES SERIES
Discharge: HOME | End: 2020-09-22
Payer: MEDICARE

## 2020-09-22 PROCEDURE — 97110 THERAPEUTIC EXERCISES: CPT

## 2020-09-22 NOTE — FLOWSHEET NOTE
Physical Therapy Daily Treatment Note    Date:  2020    Patient Name:  Cy Seip    :  1995  MRN: [id number]     Restrictions/Precautions: 12 weeks post-op TODAY  Medical/Treatment Diagnosis Information:  · Diagnosis: (R) Shoulder Bankart Repair  Insurance/Certification information:  PT Insurance Information: [place] - 30 visits  Physician Information:  Referring Practitioner: Anjelica Banegas MD  Plan of care signed (Y/N):  Y  Visit# / total visits:   , 2020 visits used prior to eval: 7     Surgery date: 2020    G-Code (if applicable): Quick Dash:  (was )    Medicare Cap (if applicable):  n/a = total amount used, updated 2020    Time in: 12:45pm        Timed Treatment: 45min. Total Treatment Time:  45min. Time out: 1:30pm  ________________________________________________________________________________________    Pain Level:    0/10  SUBJECTIVE:  Patient reports \"I am doing good, I think it is getting better\". Reports compliance with HEP.     OBJECTIVE:     Exercise/Equipment Resistance/Repetitions Other comments      UBE  x2.5min f/b    Pendulums    HEP   HEP   Cane flexion AROM              Supine elbow Flex/ext: x20 3# HEP   Supine wrist Flex/ext,sup/pro: x20 2# HEP          Shoulder isometrics (2 finger)  HEP   Scap re-ed (supine)    x3min 3#    Supine Scap protraction   x15 (R) 2#    Scap retraction  x20        Prone Rows x20 3#    Prone flexion (60-90*) x20 3#    Supine alphabet x1 3#    All 4 stabilization  x10    Prone T's, W's, Y's x10 3sec hold 1#         Wall ball x10ea (2#)    Standing shoulder flexion/scaption     Elbow flex/ext       Pulleys   x2min (flexion)  x2min (scaption)    Hip 3 way with tband    HEP   Hip ABD + EXT      Wall push up x12                       Other Therapeutic Activities: --     Manual Treatments:   PROM to (R) shoulder, rhythmic stabilization @90*    Modalities:  --     Test/Measurements:  See re-eval from

## 2020-09-29 ENCOUNTER — HOSPITAL ENCOUNTER (OUTPATIENT)
Dept: PHYSICAL THERAPY | Age: 25
Setting detail: THERAPIES SERIES
Discharge: HOME | End: 2020-09-29
Payer: MEDICARE

## 2020-09-29 PROCEDURE — 97110 THERAPEUTIC EXERCISES: CPT

## 2020-09-29 PROCEDURE — 97140 MANUAL THERAPY 1/> REGIONS: CPT

## 2020-09-29 NOTE — FLOWSHEET NOTE
9/8/2020      ASSESSMENT:  The patient performed above TE well with no increase in pain. The patient is progressing nicely. Will continue to progress as protocol allows. No pain reported at end of PT session. Encouraged patient to continue with HEP compliance. Treatment/Activity Tolerance:   [x]Patient tolerated treatment well [] Patient limited by fatique  []Patient limited by pain [] Patient limited by other medical complications  [] Other:     Goals:    Short term goals  Time Frame for Short term goals: 6-12 weeks  Short term goal 1: patient to be independent with HEP to self-manage symptoms. Short term goal 2: patient to improve (R) shoulder ROM to equal (L) to improve funcitonal use of (R) UE. Short term goal 3: patient to improve (R) shoulder strength to equal (L) to improve funcitonal use of (R) UE. Short term goal 4: patient to improve quick DASH score by at least 10 points demonstrating improved participation in everyday activity.            Plan: [x] Continue per plan of care [] Alter current plan (see comments)   [] Plan of care initiated [] Hold pending MD visit [] Discharge      Plan for Next Session:  Follow MD protocol     Re-Certification Due Date:  Visit 16      Signature:  Victoria Balderas , PT, DPT

## 2022-02-08 ENCOUNTER — APPOINTMENT (OUTPATIENT)
Dept: GENERAL RADIOLOGY | Age: 27
End: 2022-02-08
Payer: MEDICARE

## 2022-02-08 ENCOUNTER — HOSPITAL ENCOUNTER (EMERGENCY)
Age: 27
Discharge: HOME | End: 2022-02-08
Payer: MEDICARE

## 2022-02-08 VITALS
HEART RATE: 59 BPM | OXYGEN SATURATION: 99 % | SYSTOLIC BLOOD PRESSURE: 119 MMHG | HEIGHT: 66 IN | DIASTOLIC BLOOD PRESSURE: 76 MMHG | BODY MASS INDEX: 33.75 KG/M2 | RESPIRATION RATE: 20 BRPM | TEMPERATURE: 97.6 F | WEIGHT: 210 LBS

## 2022-02-08 DIAGNOSIS — S30.0XXA CONTUSION OF COCCYX, INITIAL ENCOUNTER: Primary | ICD-10-CM

## 2022-02-08 PROCEDURE — 6370000000 HC RX 637 (ALT 250 FOR IP): Performed by: NURSE PRACTITIONER

## 2022-02-08 PROCEDURE — 99284 EMERGENCY DEPT VISIT MOD MDM: CPT

## 2022-02-08 PROCEDURE — 72220 X-RAY EXAM SACRUM TAILBONE: CPT

## 2022-02-08 RX ORDER — DICLOFENAC SODIUM 75 MG/1
75 TABLET, DELAYED RELEASE ORAL 2 TIMES DAILY
Qty: 14 TABLET | Refills: 0 | Status: SHIPPED | OUTPATIENT
Start: 2022-02-08 | End: 2022-08-13

## 2022-02-08 RX ORDER — BUTALBITAL, ACETAMINOPHEN AND CAFFEINE 50; 325; 40 MG/1; MG/1; MG/1
1 TABLET ORAL EVERY 4 HOURS PRN
Status: DISCONTINUED | OUTPATIENT
Start: 2022-02-08 | End: 2022-02-08 | Stop reason: HOSPADM

## 2022-02-08 RX ADMIN — BUTALBITAL, ACETAMINOPHEN, AND CAFFEINE 1 TABLET: 50; 325; 40 TABLET ORAL at 12:57

## 2022-02-08 ASSESSMENT — PAIN DESCRIPTION - LOCATION
LOCATION: COCCYX
LOCATION: COCCYX

## 2022-02-08 ASSESSMENT — PAIN DESCRIPTION - DESCRIPTORS: DESCRIPTORS: PRESSURE;SHARP

## 2022-02-08 ASSESSMENT — PAIN SCALES - GENERAL
PAINLEVEL_OUTOF10: 5
PAINLEVEL_OUTOF10: 9

## 2022-02-08 ASSESSMENT — PAIN DESCRIPTION - PAIN TYPE: TYPE: ACUTE PAIN

## 2022-02-08 ASSESSMENT — PAIN DESCRIPTION - FREQUENCY: FREQUENCY: CONTINUOUS

## 2022-02-08 NOTE — Clinical Note
Jb Fierro was seen and treated in our emergency department on 2/8/2022. She may return to work on 02/09/2022. If you have any questions or concerns, please don't hesitate to call.       Yvonne Mallory, SHANEL - CNP

## 2022-02-11 NOTE — ED PROVIDER NOTES
[address] [place] [address]  ED  EMERGENCY DEPARTMENT ENCOUNTER      This patient was not seen and evaluated by the attending physician. Pt Name: Cee Thompson  MRN: [id number]  [place] 1995  Date of evaluation: 2/8/2022  Provider: SHANEL Angelo  PCP: SHANEL Bowles CNP      History provided by the patient. CHIEFCOMPLAINT:     Chief Complaint   Patient presents with    Fall     Pt was sledding on Friday, jumped a hill and landed on tailbone. Pt slipped on ice in driveway last night and landed on tailbone    Tailbone Pain       HISTORY OF PRESENT ILLNESS:      Cee Thompson is a 27 y.o. female who presents to [address] [place] [address]  ED with complaints of fall. Patient states that she was sledding on Friday when she jumped a hill landed on her tailbone, complaining of tailbone pain since then. Patient denies any loss control of bowel or bladder, denies any tingling or numbness or saddle anesthesia. She is here for further evaluation. LOCATION:sacrum/coccyx  QUALITY:ache  SEVERITY:9  DURATION:friday  MODIFYING FACTORS:worse with movement    Nursing Notes were reviewed     REVIEW OF SYSTEMS:     Review of Systems  All systems, a total of 10, are reviewed and negative except for those that were just noted in history present illness. PAST MEDICAL HISTORY:   History reviewed. No pertinent past medical history. SURGICAL HISTORY:      Past Surgical History:   Procedure Laterality Date    BONY PELVIS SURGERY      CHOLECYSTECTOMY      SHOULDER SURGERY Right 06/30/2020    (R) Bankart Repair         CURRENT MEDICATIONS:       Discharge Medication List as of 2/8/2022  1:33 PM            ALLERGIES:    Patient has no known allergies. FAMILY HISTORY:     History reviewed. No pertinent family history.        SOCIAL HISTORY:     Social History     Socioeconomic History    Marital status: Single     Spouse name: None    Number of children: None    Years of education: None    Highest education level: None   Occupational History    None   Tobacco Use    Smoking status: Former Smoker     Packs/day: 0.50     Types: Cigarettes    Smokeless tobacco: Never Used   Vaping Use    Vaping Use: Every day    Substances: Nicotine   Substance and Sexual Activity    Alcohol use: Yes     Comment: socialy     Drug use: No    Sexual activity: None   Other Topics Concern    None   Social History Narrative    None     Social Determinants of Health     Financial Resource Strain:     Difficulty of Paying Living Expenses: Not on file   Food Insecurity:     Worried About Running Out of Food in the Last Year: Not on file    Jitendra of Food in the Last Year: Not on file   Transportation Needs:     Lack of Transportation (Medical): Not on file    Lack of Transportation (Non-Medical):  Not on file   Physical Activity:     Days of Exercise per Week: Not on file    Minutes of Exercise per Session: Not on file   Stress:     Feeling of Stress : Not on file   Social Connections:     Frequency of Communication with Friends and Family: Not on file    Frequency of Social Gatherings with Friends and Family: Not on file    Attends Scientology Services: Not on file    Active Member of [address] [place] [address] or Organizations: Not on file    Attends Club or Organization Meetings: Not on file    Marital Status: Not on file   Intimate Partner Violence:     Fear of Current or Ex-Partner: Not on file    Emotionally Abused: Not on file    Physically Abused: Not on file    Sexually Abused: Not on file   Housing Stability:     Unable to Pay for Housing in the Last Year: Not on file    Number of [place] in the Last Year: Not on file    Unstable Housing in the Last Year: Not on file       SCREENINGS:             PHYSICAL EXAM:       ED Triage Vitals [02/08/22 1215]   BP Temp Temp Source Pulse Resp SpO2 Height Weight   118/81 97.6 °F (36.4 °C) Oral 82 18 99 % 5' 6\" (1.676 m) 210 lb (95.3 kg) Physical Exam    CONSTITUTIONAL: Awake and alert. Cooperative. Well-developed. Well-nourished. Vitals:    02/08/22 1215 02/08/22 1327   BP: 118/81 119/76   Pulse: 82 59   Resp: 18 20   Temp: 97.6 °F (36.4 °C)    TempSrc: Oral    SpO2: 99% 99%   Weight: 210 lb (95.3 kg)    Height: 5' 6\" (1.676 m)      HENT: Normocephalic. Atraumatic. External ears normal, without discharge. TMs clear bilaterally. Nonasal discharge. Oropharynx clear, no erythema. Mucous membranes moist.  EYES: Conjunctiva non-injected, nolid abnormalities noted. No scleral icterus. PERRL. EOM's grossly intact. Anterior chambers clear. NECK: Supple. Normal ROM. No meningismus. No thyroid tenderness or swelling noted. CARDIOVASCULAR: RRR. No [place]. No carotid bruits. PULMONARY/CHEST WALL: Effort normal. No tachypnea. Lungs clear to ausculation. ABDOMEN: Normal BS. Soft. Nondistended. No tenderness to palpation. No guarding. No hernias noted. No splenomegaly. Back: Spine is midline. No ecchymosis. No crepituson palpation. No obvious subluxation of vertebral column. No saddle anesthesia or evidence of cauda equina. Mild sacral tenderness noted. /ANORECTAL: Not assessed  MUSKULOSKELETAL: Normal ROM. No acute deformities. No edema. No tenderness to palpate. SKIN: Warm and dry. NEUROLOGICAL:  GCS 15. CN II-XII grossly intact. Strength is 5/5 in all extremities and sensation is intact. PSYCHIATRIC: Normal affect, normal insight and judgement. Alert and oriented x 3. DIAGNOSTIC RESULTS:     LABS:    No results found for this visit on 02/08/22. RADIOLOGY:  All x-ray studies are viewed/reviewed by me. Formal interpretations per the radiologist are as follows:      XR SACRUM COCCYX (MIN 2 VIEWS)   Final Result   Remote postsurgical changes in the left pelvis. No acute osseous abnormality. EKG:  See EKG interpretation by an attending physician.       PROCEDURES:   N/A    CRITICAL CARE TIME: N/A    CONSULTS:  None      EMERGENCY DEPARTMENT COURSE andDIFFERENTIAL DIAGNOSIS/MDM:   Vitals:    Vitals:    02/08/22 1215 02/08/22 1327   BP: 118/81 119/76   Pulse: 82 59   Resp: 18 20   Temp: 97.6 °F (36.4 °C)    TempSrc: Oral    SpO2: 99% 99%   Weight: 210 lb (95.3 kg)    Height: 5' 6\" (1.676 m)        Patient wasgiven the following medications:  Medications - No data to display      Patient was evaluated independently by myself with the attending physician available for consultation. Patient presented to the emergency room today with complaints of tailbone pain after a fall. Radiographic imaging showed no acute fracture she had no evidence of cauda equina, no saddle anesthesia or loss control bowel bladder. She was discharged home good condition, vital signs stable upon discharge. Patient laboratory studies, radiographic imaging, and assessment were all discussed with the patient and/orpatient family. There was shared decision-making between myself as well as the patient and/or their surrogate and we are all in agreement with discharge home. There was an opportunity for questions and all questions were answered tothe best of my ability and to the satisfaction of the patient and/or patient family. FINAL IMPRESSION:      1.  Contusion of coccyx, initial encounter          DISPOSITION/PLAN:   DISPOSITION Decision To Discharge      PATIENT REFERRED TO:  Juan J Mejia, [address]  [address] [place] [address]  [phone]    Call   For follow up      [address]:  Discharge Medication List as of 2/8/2022  1:33 PM      START taking these medications    Details   diclofenac (VOLTAREN) 75 MG EC tablet Take 1 tablet by mouth 2 times daily for 7 days, Disp-14 tablet, R-0Print                        (Please note thatportions of this note were completed with a voice recognition program.  Efforts were made to edit the dictations, but occasionally words are mis-transcribed.)    Elizabeth Cuadra Lolita Lynch - CNP-C (electronicallysigned)        Sharmin Clemente, SHANEL - ERASMO  02/10/22 5774

## 2022-03-08 ENCOUNTER — HOSPITAL ENCOUNTER (EMERGENCY)
Age: 27
Discharge: HOME | End: 2022-03-08
Payer: MEDICARE

## 2022-03-08 VITALS
TEMPERATURE: 98.7 F | SYSTOLIC BLOOD PRESSURE: 105 MMHG | HEART RATE: 80 BPM | RESPIRATION RATE: 16 BRPM | DIASTOLIC BLOOD PRESSURE: 67 MMHG | OXYGEN SATURATION: 100 %

## 2022-03-08 DIAGNOSIS — E16.2 HYPOGLYCEMIA: ICD-10-CM

## 2022-03-08 DIAGNOSIS — K62.5 RECTAL BLEEDING: Primary | ICD-10-CM

## 2022-03-08 LAB
A/G RATIO: 1.8 (ref 1.1–2.2)
ALBUMIN SERPL-MCNC: 4.5 G/DL (ref 3.4–5)
ALP BLD-CCNC: 91 U/L (ref 40–129)
ALT SERPL-CCNC: 22 U/L (ref 10–40)
ANION GAP SERPL CALCULATED.3IONS-SCNC: 12 MMOL/L (ref 3–16)
AST SERPL-CCNC: 21 U/L (ref 15–37)
BACTERIA: ABNORMAL /HPF
BASOPHILS ABSOLUTE: 0 K/UL (ref 0–0.2)
BASOPHILS RELATIVE PERCENT: 0.3 %
BILIRUB SERPL-MCNC: <0.2 MG/DL (ref 0–1)
BILIRUBIN URINE: NEGATIVE
BLOOD, URINE: NEGATIVE
BUN BLDV-MCNC: 9 MG/DL (ref 7–20)
CALCIUM SERPL-MCNC: 9.3 MG/DL (ref 8.3–10.6)
CHLORIDE BLD-SCNC: 100 MMOL/L (ref 99–110)
CLARITY: CLEAR
CO2: 26 MMOL/L (ref 21–32)
COLOR: ABNORMAL
CREAT SERPL-MCNC: 0.6 MG/DL (ref 0.6–1.1)
EOSINOPHILS ABSOLUTE: 0.2 K/UL (ref 0–0.6)
EOSINOPHILS RELATIVE PERCENT: 1.8 %
EPITHELIAL CELLS, UA: ABNORMAL /HPF (ref 0–5)
GFR AFRICAN AMERICAN: >60
GFR NON-AFRICAN AMERICAN: >60
GLUCOSE BLD-MCNC: 66 MG/DL (ref 70–99)
GLUCOSE URINE: NEGATIVE MG/DL
HCG QUALITATIVE: NEGATIVE
HCT VFR BLD CALC: 37.8 % (ref 36–48)
HEMOGLOBIN: 12.5 G/DL (ref 12–16)
KETONES, URINE: NEGATIVE MG/DL
LEUKOCYTE ESTERASE, URINE: ABNORMAL
LIPASE: 15 U/L (ref 13–60)
LYMPHOCYTES ABSOLUTE: 3 K/UL (ref 1–5.1)
LYMPHOCYTES RELATIVE PERCENT: 34.7 %
MCH RBC QN AUTO: 28.5 PG (ref 26–34)
MCHC RBC AUTO-ENTMCNC: 33.1 G/DL (ref 31–36)
MCV RBC AUTO: 86 FL (ref 80–100)
MICROSCOPIC EXAMINATION: YES
MONOCYTES ABSOLUTE: 0.8 K/UL (ref 0–1.3)
MONOCYTES RELATIVE PERCENT: 8.9 %
NEUTROPHILS ABSOLUTE: 4.7 K/UL (ref 1.7–7.7)
NEUTROPHILS RELATIVE PERCENT: 54.3 %
NITRITE, URINE: NEGATIVE
OCCULT BLOOD DIAGNOSTIC: NORMAL
PDW BLD-RTO: 13.3 % (ref 12.4–15.4)
PH UA: 6 (ref 5–8)
PLATELET # BLD: 295 K/UL (ref 135–450)
PMV BLD AUTO: 8.3 FL (ref 5–10.5)
POTASSIUM REFLEX MAGNESIUM: 4 MMOL/L (ref 3.5–5.1)
PROTEIN UA: NEGATIVE MG/DL
RBC # BLD: 4.39 M/UL (ref 4–5.2)
RBC UA: ABNORMAL /HPF (ref 0–4)
SODIUM BLD-SCNC: 138 MMOL/L (ref 136–145)
SPECIFIC GRAVITY UA: <=1.005 (ref 1–1.03)
SPECIMEN STATUS: NORMAL
TOTAL PROTEIN: 7 G/DL (ref 6.4–8.2)
URINE REFLEX TO CULTURE: ABNORMAL
URINE TYPE: ABNORMAL
UROBILINOGEN, URINE: 0.2 E.U./DL
WBC # BLD: 8.7 K/UL (ref 4–11)
WBC UA: ABNORMAL /HPF (ref 0–5)

## 2022-03-08 PROCEDURE — 99282 EMERGENCY DEPT VISIT SF MDM: CPT

## 2022-03-08 PROCEDURE — 84703 CHORIONIC GONADOTROPIN ASSAY: CPT

## 2022-03-08 PROCEDURE — G0328 FECAL BLOOD SCRN IMMUNOASSAY: HCPCS

## 2022-03-08 PROCEDURE — 85025 COMPLETE CBC W/AUTO DIFF WBC: CPT

## 2022-03-08 PROCEDURE — 83690 ASSAY OF LIPASE: CPT

## 2022-03-08 PROCEDURE — 80053 COMPREHEN METABOLIC PANEL: CPT

## 2022-03-08 PROCEDURE — 81001 URINALYSIS AUTO W/SCOPE: CPT

## 2022-03-08 ASSESSMENT — PAIN SCALES - GENERAL: PAINLEVEL_OUTOF10: 0

## 2022-03-08 ASSESSMENT — PAIN - FUNCTIONAL ASSESSMENT: PAIN_FUNCTIONAL_ASSESSMENT: 0-10

## 2022-03-08 NOTE — ED PROVIDER NOTES
[facility]  ED  EMERGENCY DEPARTMENT ENCOUNTER        Pt Name: Tahmina Ni  MRN: [id number]  [place] 1995  Date of evaluation: 3/8/2022  Provider: SHANEL Valderrama CNP  PCP: SHANEL Bernal CNP  Note Started: 3:57 PM EST       RUBÉN. I have evaluated this patient. My supervising physician was available for consultation. CHIEF COMPLAINT       Chief Complaint   Patient presents with    Other     pt with blood in stool. . pt with hx of ulcers. . but diet has been well controlled. . dull pain in lower abdomin       HISTORY OF PRESENT ILLNESS   (Location, Timing/Onset, Context/Setting, Quality, Duration, Modifying Factors, Severity, Associated Signs and Symptoms)  Note limiting factors. Chief Complaint: Bloody stools    Tahmina Ni is a 27 y.o. female with medical history of cholecystectomy presents emergency department with complaints of blood noted in the stools since yesterday. She said intermittent bright red blood that was also in the toilet bowl water since yesterday. He has a intermittent crampy abdominal pain. Denies any history of similar symptoms. Denies previous EGD or colonoscopy. She does have a previous history of stomach ulcers, although has been trying to avoid spicy food and this helps alleviate the symptoms. Does note family history of colon polyps, although denies colon cancer, diverticulitis, colitis, or Crohn's disease. Able to eat and drink without difficulty. Denies being anticoagulated. Denies any associated rashes, fevers, vaginal bleeding or discharge, urinary symptoms, headache, neck pain, back pain, lightheaded, dizzy, or syncope. Former smoker, occasional alcohol use, smokes marijuana, denies IVDA. Nursing Notes were all reviewed and agreed with or any disagreements were addressed in the HPI.     REVIEW OF SYSTEMS    (2-9 systems for level 4, 10 or more for level 5)     Review of Systems    Positives and Pertinent negatives as per HPI. Except as noted above in the ROS, all other systems were reviewed and negative. PAST MEDICAL HISTORY   History reviewed. No pertinent past medical history. SURGICAL HISTORY     Past Surgical History:   Procedure Laterality Date    BONY PELVIS SURGERY      CHOLECYSTECTOMY      SHOULDER SURGERY Right 06/30/2020    (R) [employer]         CURRENTMEDICATIONS       Previous Medications    DICLOFENAC (VOLTAREN) 75 MG EC TABLET    Take 1 tablet by mouth 2 times daily for 7 days         ALLERGIES     Patient has no known allergies. FAMILYHISTORY     History reviewed. No pertinent family history. SOCIAL HISTORY       Social History     Tobacco Use    Smoking status: Former Smoker     Packs/day: 0.50     Types: Cigarettes    Smokeless tobacco: Never Used   Vaping Use    Vaping Use: Every day    Substances: Nicotine   Substance Use Topics    Alcohol use: Yes     Comment: socialy     Drug use: Yes     Types: Marijuana (Weed)       SCREENINGS             PHYSICAL EXAM    (up to 7 for level 4, 8 or more for level 5)     ED Triage Vitals   BP Temp Temp Source Pulse Resp SpO2 Height Weight   03/08/22 1340 03/08/22 1340 03/08/22 1340 03/08/22 1340 03/08/22 1340 03/08/22 1544 -- --   (!) 134/90 98.7 °F (37.1 °C) Oral 78 16 100 %         Physical Exam  Vitals and nursing note reviewed. Constitutional:       General: She is awake. Appearance: Normal appearance. She is well-developed. She is obese. HENT:      Head: Normocephalic and atraumatic. Nose: Nose normal.   Eyes:      General:         Right eye: No discharge. Left eye: No discharge. Cardiovascular:      Rate and Rhythm: Normal rate and regular rhythm. Heart sounds: Normal heart sounds. Pulmonary:      Effort: Pulmonary effort is normal. No respiratory distress. Breath sounds: Normal breath sounds. Abdominal:      General: Bowel sounds are normal.      Palpations: Abdomen is soft.       Tenderness: There is no abdominal tenderness. There is no right CVA tenderness or left CVA tenderness. Genitourinary:     Rectum: Guaiac result negative. No tenderness or external hemorrhoid. Comments: Mayra Malnoe RN chaperoned rectal exam  Musculoskeletal:         General: Normal range of motion. Cervical back: Normal range of motion. Skin:     General: Skin is warm and dry. Coloration: Skin is not pale. Neurological:      Mental Status: She is alert and oriented to person, place, and time. Psychiatric:         Behavior: Behavior normal. Behavior is cooperative. DIAGNOSTIC RESULTS   LABS:    Labs Reviewed   COMPREHENSIVE METABOLIC PANEL W/ REFLEX TO MG FOR LOW K - Abnormal; Notable for the following components:       Result Value    Glucose 66 (*)     All other components within normal limits   URINALYSIS WITH REFLEX TO CULTURE - Abnormal; Notable for the following components:    Leukocyte Esterase, Urine TRACE (*)     All other components within normal limits   MICROSCOPIC URINALYSIS - Abnormal; Notable for the following components:    Bacteria, UA Rare (*)     All other components within normal limits   CBC WITH AUTO DIFFERENTIAL   BLOOD OCCULT STOOL DIAGNOSTIC    Narrative:     ORDER#: [id number]                          ORDERED BY: Aubree Silva  SOURCE: Stool                              COLLECTED:  03/08/22 13:57  ANTIBIOTICS AT YESSI.:                      RECEIVED :  03/08/22 14:42   HCG, SERUM, QUALITATIVE   LIPASE   SAMPLE POSSIBLE BLOOD BANK TESTING       When ordered only abnormal lab results are displayed. All other labs were within normal range or not returned as of this dictation. EKG: When ordered, EKG's are interpreted by the Emergency Department Physician in the absence of a cardiologist.  Please see their note for interpretation of EKG.     RADIOLOGY:   Non-plain film images such as CT, Ultrasound and MRI are read by the radiologist. Plain radiographic images are visualized and preliminarily interpreted by the ED Provider with the below findings:        Interpretation per the Radiologist below, if available at the time of this note:    No orders to display     No results found. PROCEDURES   Unless otherwise noted below, none     Procedures    CRITICAL CARE TIME       CONSULTS:  None      EMERGENCY DEPARTMENT COURSE and DIFFERENTIAL DIAGNOSIS/MDM:   Vitals:    Vitals:    03/08/22 1340 03/08/22 1544   BP: (!) 134/90 105/67   Pulse: 78 80   Resp: 16 16   Temp: 98.7 °F (37.1 °C)    TempSrc: Oral    SpO2:  100%       Patient was given the following medications:  Medications - No data to display      Care of this patient took place during the COVID-19 pandemic emergency. ED COURSE & MEDICAL DECISION MAKING    - The patient presented to the ER with complaints of rectal bleeding. Vital signs were reviewed. Exam well-developed, well-nourished female who appears uncomfortable. Peripheral IV placed. Labs, Imaging ordered. - Pertinent Labs & Imaging studies reviewed. (See chart for details)   -  Patient seen and evaluated in the emergency department. -  Triage and nursing notes reviewed and incorporated. -  Old chart records reviewed and incorporated. -  RUBÉN. I have evaluated this patient. My supervising physician was available for consultation.  -  Differential diagnosis includes: pelvic inflammatory disease, TOA, ovarian torsion, kidney stone, pyelonephritis, UTI, BV/vaginitis, cervicitis, ovarian cysts, round ligament pain, pregnancy (including ectopic), appendicitis, bowel obstruction, diverticulitis, hernia, gastroenteritis, colitis vs COVID-19  -  Work-up included:  See above  -  ED treatment included:    -  Results discussed with patient. Kay Galindo is a 78-year-old female with complaints of bright red rectal bleeding with intermittent abdominal cramping since yesterday. She had a bowel movement and noticed bright red blood in the stool and mixed in the toilet water. She denies being anticoagulated. Denies history of similar symptoms. On exam abdomen soft nontender bowel sounds active x4 quadrants. Rectal exam was completed no external hemorrhoids noted, no rectal tenderness and no active bleeding. Lab work and imaging obtained. Trace leukocytes in the urine with rare bacteria. Hemoccult negative. CBC unremarkable. hCG negative. CMP with glucose 66. Lipase 15. Patient was informed on these results. No active rectal bleeding while in the emergency department. Instructed to follow-up outpatient with PCP and GI for additional outpatient testing to include an EGD and colonoscopy. She was given strict return discharge instructions. Shared decision making is completed and patient is stable for discharge at this time. FINAL IMPRESSION      1. Rectal bleeding    2.  Hypoglycemia          DISPOSITION/PLAN   DISPOSITION Decision To Discharge 03/08/2022 03:55:50 PM      PATIENT REFERRED TO:  Juan J Mejia, [address] [address]  [phone]    Call in 2 days  As needed, If symptoms worsen    [facility]  ED  [address] [facility] [address] [facility]  Go to   As needed    Rashaun Espinoza MD  [facility], [address] [place] [address]  [phone]    Call   As needed      DISCHARGE MEDICATIONS:  New Prescriptions    No medications on file       DISCONTINUED MEDICATIONS:  Discontinued Medications    No medications on file              (Please note that portions of this note were completed with a voice recognition program.  Efforts were made to edit the dictations but occasionally words are mis-transcribed.)    SHANEL Lee CNP (electronically signed)            SHANEL Lee CNP  03/08/22 9097

## 2022-03-08 NOTE — ED NOTES
Pt left ED with IV in arm. I called pt made her aware to come back to ED to have IV removed or we would have to call the Police to come to your house and have the IV removed\".      Radha Randhawa, JESIKA  76/49/01 8403

## 2022-03-15 ENCOUNTER — HOSPITAL ENCOUNTER (EMERGENCY)
Age: 27
Discharge: HOME | End: 2022-03-15
Payer: MEDICARE

## 2022-03-15 ENCOUNTER — APPOINTMENT (OUTPATIENT)
Dept: GENERAL RADIOLOGY | Age: 27
End: 2022-03-15
Payer: MEDICARE

## 2022-03-15 VITALS
SYSTOLIC BLOOD PRESSURE: 100 MMHG | RESPIRATION RATE: 16 BRPM | WEIGHT: 210 LBS | HEART RATE: 71 BPM | DIASTOLIC BLOOD PRESSURE: 67 MMHG | TEMPERATURE: 98 F | BODY MASS INDEX: 33.75 KG/M2 | HEIGHT: 66 IN | OXYGEN SATURATION: 100 %

## 2022-03-15 DIAGNOSIS — R05.9 COUGH: Primary | ICD-10-CM

## 2022-03-15 PROCEDURE — 6360000002 HC RX W HCPCS: Performed by: PHYSICIAN ASSISTANT

## 2022-03-15 PROCEDURE — 6370000000 HC RX 637 (ALT 250 FOR IP): Performed by: PHYSICIAN ASSISTANT

## 2022-03-15 PROCEDURE — 71046 X-RAY EXAM CHEST 2 VIEWS: CPT

## 2022-03-15 PROCEDURE — 99283 EMERGENCY DEPT VISIT LOW MDM: CPT

## 2022-03-15 RX ORDER — ALBUTEROL SULFATE 2.5 MG/3ML
2.5 SOLUTION RESPIRATORY (INHALATION) ONCE
Status: COMPLETED | OUTPATIENT
Start: 2022-03-15 | End: 2022-03-15

## 2022-03-15 RX ORDER — PREDNISONE 10 MG/1
60 TABLET ORAL DAILY
Qty: 30 TABLET | Refills: 0 | Status: SHIPPED | OUTPATIENT
Start: 2022-03-15 | End: 2022-03-20

## 2022-03-15 RX ORDER — IPRATROPIUM BROMIDE AND ALBUTEROL SULFATE 2.5; .5 MG/3ML; MG/3ML
1 SOLUTION RESPIRATORY (INHALATION) ONCE
Status: COMPLETED | OUTPATIENT
Start: 2022-03-15 | End: 2022-03-15

## 2022-03-15 RX ORDER — ALBUTEROL SULFATE 90 UG/1
AEROSOL, METERED RESPIRATORY (INHALATION)
Qty: 18 G | Refills: 1 | Status: SHIPPED | OUTPATIENT
Start: 2022-03-15

## 2022-03-15 RX ORDER — PREDNISONE 20 MG/1
40 TABLET ORAL ONCE
Status: COMPLETED | OUTPATIENT
Start: 2022-03-15 | End: 2022-03-15

## 2022-03-15 RX ADMIN — ALBUTEROL SULFATE 2.5 MG: 2.5 SOLUTION RESPIRATORY (INHALATION) at 19:49

## 2022-03-15 RX ADMIN — PREDNISONE 40 MG: 20 TABLET ORAL at 19:48

## 2022-03-15 RX ADMIN — IPRATROPIUM BROMIDE AND ALBUTEROL SULFATE 1 AMPULE: 2.5; .5 SOLUTION RESPIRATORY (INHALATION) at 19:49

## 2022-03-15 ASSESSMENT — ENCOUNTER SYMPTOMS
GASTROINTESTINAL NEGATIVE: 1
COUGH: 1

## 2022-03-15 NOTE — ED PROVIDER NOTES
[address] ED  EMERGENCY DEPARTMENT ENCOUNTER        Pt Name: Kobi Martinez  MRN: [id number]  [place] 1995  Date of evaluation: 3/15/2022  Provider: Nakia Montaño PA-C  PCP: SHANEL Hidalgo CNP  Note Started: 6:06 PM EDT       RUBÉN. I have evaluated this patient. My supervising physician was available for consultation. CHIEF COMPLAINT       Chief Complaint   Patient presents with    Cough     pt states cough and congestion for 2 days       HISTORY OF PRESENT ILLNESS   (Location, Timing/Onset, Context/Setting, Quality, Duration, Modifying Factors, Severity, Associated Signs and Symptoms)  Note limiting factors. Chief Complaint: cough     Kobi Martinez is a 32 y.o. female with a past medical history of tobacco use brought in today by private vehicle with complaints of productive cough with clear sputum and nasal congestion over the past 2 days. Onset of symptoms x2 days. Duration of symptoms have been persistent since onset. Context includes cough and nasal congestion. Denies any fevers or chills. Denies chest pain or shortness of breath. Denies nausea vomiting or diarrhea. States she does vape and smoke. Denies history of asthma or allergies. States she did use her son's inhaler which did seem to help. She otherwise denies any other complaints. No aggravating symptoms. No alleviating symptoms. Nursing Notes were all reviewed and agreed with or any disagreements were addressed in the HPI. REVIEW OF SYSTEMS    (2-9 systems for level 4, 10 or more for level 5)     Review of Systems   Constitutional: Negative. HENT: Negative. Respiratory: Positive for cough. Cardiovascular: Negative. Gastrointestinal: Negative. Musculoskeletal: Negative. Skin: Negative. Neurological: Negative. Positives and Pertinent negatives as per HPI. Except as noted above in the ROS, all other systems were reviewed and negative.        PAST MEDICAL HISTORY History reviewed. No pertinent past medical history. SURGICAL HISTORY     Past Surgical History:   Procedure Laterality Date    BONY PELVIS SURGERY      CHOLECYSTECTOMY      SHOULDER SURGERY Right 06/30/2020    (R) [employer] Repair         CURRENTMEDICATIONS       Discharge Medication List as of 3/15/2022  7:45 PM      CONTINUE these medications which have NOT CHANGED    Details   diclofenac (VOLTAREN) 75 MG EC tablet Take 1 tablet by mouth 2 times daily for 7 days, Disp-14 tablet, R-0Print               ALLERGIES     Patient has no known allergies. FAMILYHISTORY     History reviewed. No pertinent family history. SOCIAL HISTORY       Social History     Tobacco Use    Smoking status: Current Every Day Smoker     Packs/day: 0.50     Types: Cigarettes, E-Cigarettes    Smokeless tobacco: Never Used   Vaping Use    Vaping Use: Every day    Substances: Nicotine   Substance Use Topics    Alcohol use: Yes    Drug use: Yes     Types: Marijuana (Weed)       SCREENINGS    Thang Coma Scale  Eye Opening: Spontaneous  Best Verbal Response: Oriented  Best Motor Response: Obeys commands  Thang Coma Scale Score: 15        PHYSICAL EXAM    (up to 7 for level 4, 8 or more for level 5)     ED Triage Vitals [03/15/22 1753]   BP Temp Temp Source Pulse Resp SpO2 Height Weight   131/79 98 °F (36.7 °C) Tympanic 91 16 99 % 5' 6\" (1.676 m) 210 lb (95.3 kg)       Physical Exam  Vitals and nursing note reviewed. Constitutional:       General: She is awake. She is not in acute distress. Appearance: Normal appearance. She is well-developed and overweight. She is not ill-appearing, toxic-appearing or diaphoretic. HENT:      Head: Normocephalic and atraumatic. Right Ear: Tympanic membrane and external ear normal. No drainage, swelling or tenderness. No middle ear effusion. There is no impacted cerumen. No foreign body. No mastoid tenderness. No PE tube. No hemotympanum.  Tympanic membrane is not injected, scarred, perforated, erythematous, retracted or bulging. Left Ear: Tympanic membrane and external ear normal. No drainage, swelling or tenderness. No middle ear effusion. There is no impacted cerumen. No foreign body. No mastoid tenderness. No PE tube. No hemotympanum. Tympanic membrane is not injected, scarred, perforated, erythematous, retracted or bulging. Nose: Nose normal.      Mouth/Throat:      Lips: Pink. No lesions. Mouth: Mucous membranes are moist. No injury, lacerations, oral lesions or angioedema. Dentition: Normal dentition. Does not have dentures. No dental tenderness, gingival swelling, dental caries, dental abscesses or gum lesions. Tongue: No lesions. Tongue does not deviate from midline. Palate: No mass and lesions. Pharynx: Oropharynx is clear. Uvula midline. No pharyngeal swelling, oropharyngeal exudate, posterior oropharyngeal erythema or uvula swelling. Tonsils: No tonsillar exudate or tonsillar abscesses. 0 on the right. 0 on the left. Eyes:      General:         Right eye: No discharge. Left eye: No discharge. Neck:      Meningeal: Brudzinski's sign and Kernig's sign absent. Cardiovascular:      Rate and Rhythm: Normal rate and regular rhythm. Pulses:           Radial pulses are 2+ on the right side and 2+ on the left side. Heart sounds: Normal heart sounds. No murmur heard. No gallop. Pulmonary:      Effort: Pulmonary effort is normal. No respiratory distress. Breath sounds: Normal breath sounds. No decreased breath sounds, wheezing, rhonchi or rales. Chest:      Chest wall: No tenderness. Musculoskeletal:         General: No deformity. Normal range of motion. Cervical back: Full passive range of motion without pain, normal range of motion and neck supple. Right lower leg: No edema. Left lower leg: No edema. Lymphadenopathy:      Cervical: No cervical adenopathy.       Right cervical: No superficial, deep or posterior cervical adenopathy. Left cervical: No superficial, deep or posterior cervical adenopathy. Skin:     General: Skin is warm and dry. Neurological:      General: No focal deficit present. Mental Status: She is alert and oriented to person, place, and time. GCS: GCS eye subscore is 4. GCS verbal subscore is 5. GCS motor subscore is 6. Cranial Nerves: Cranial nerves are intact. Psychiatric:         Behavior: Behavior normal. Behavior is cooperative. DIAGNOSTIC RESULTS   LABS:    Labs Reviewed - No data to display    When ordered only abnormal lab results are displayed. All other labs were within normal range or not returned as of this dictation. EKG: When ordered, EKG's are interpreted by the Emergency Department Physician in the absence of a cardiologist.  Please see their note for interpretation of EKG. RADIOLOGY:   Non-plain film images such as CT, Ultrasound and MRI are read by the radiologist. Plain radiographic images are visualized and preliminarily interpreted by the ED Provider with the below findings:        Interpretation per the Radiologist below, if available at the time of this note:    XR CHEST (2 VW)   Final Result   No acute cardiopulmonary findings. No results found.         PROCEDURES   Unless otherwise noted below, none     Procedures    CRITICAL CARE TIME       CONSULTS:  None      EMERGENCY DEPARTMENT COURSE and DIFFERENTIAL DIAGNOSIS/MDM:   Vitals:    Vitals:    03/15/22 1753 03/15/22 1953   BP: 131/79 100/67   Pulse: 91 71   Resp: 16 16   Temp: 98 °F (36.7 °C)    TempSrc: Tympanic    SpO2: 99% 100%   Weight: 210 lb (95.3 kg)    Height: 5' 6\" (1.676 m)        Patient was given the following medications:  Medications   ipratropium-albuterol (DUONEB) nebulizer solution 1 ampule (1 ampule Inhalation Given 3/15/22 1949)   albuterol (PROVENTIL) nebulizer solution 2.5 mg (2.5 mg Nebulization Given 3/15/22 1949)   predniSONE (DELTASONE) tablet 40 mg (40 mg Oral Given 3/15/22 1948)           Patient brought in today by private vehicle with complaints of a cough. On exam she is alert oriented afebrile breathing on room air satting at 99%. Nontoxic in appearance. No acute respiratory distress. Old labs and records reviewed. Patient seen and evaluated by myself and my attending was available as needed. Patient given albuterol inhaler and steroids here. Chest x-ray shows no acute findings. Upon reevaluation she does report she is feeling much better. Will discharge home with albuterol inhaler and steroids. Patient told to follow-up with PCP as needed. Patient told return immediately if she developed any new or worsening symptoms including but not limited to fevers chills worsening cough or any new or worsening symptoms. She vocalized understanding. Patient discharged in stable condition. FINAL IMPRESSION      1. Cough          DISPOSITION/PLAN   DISPOSITION Decision To Discharge 03/15/2022 08:11:46 PM      PATIENT REFERRED TO:  Jenelle Cheek, [address]  [address] [place] [address]  [phone]    Schedule an appointment as soon as possible for a visit   As needed, If symptoms worsen    [facility] ED  [address] [facility] [address]  Schedule an appointment as soon as possible for a visit   As needed, If symptoms worsen      DISCHARGE MEDICATIONS:  Discharge Medication List as of 3/15/2022  7:45 PM      START taking these medications    Details   predniSONE (DELTASONE) 10 MG tablet Take 6 tablets by mouth daily for 5 doses, Disp-30 tablet, R-0Normal      albuterol sulfate HFA (PROAIR HFA) 108 (90 Base) MCG/ACT inhaler Use every 4 hours 2 puffs while awake for 7-10 days then PRN wheezing  Dispense with SPACER and Instruct on use.   May sub Ventolin or Proventil as needed per Insurance., Disp-18 g, R-1Normal             DISCONTINUED MEDICATIONS:  Discharge Medication List as of 3/15/2022  7:45 PM                 (Please note that portions of this note were completed with a voice recognition program.  Efforts were made to edit the dictations but occasionally words are mis-transcribed.)    Tacho Rivera PA-C (electronically signed)            Tacho Rivera PA-C  03/15/22 [facility]  03/25/22 3290

## 2022-03-15 NOTE — ED NOTES
Patient discharged in stable, ambulatory condition with all documented belongings. This nurse reviewed discharge instructions, pt verbalized understanding.        Britt Stone RN  03/15/22 7353

## 2022-08-11 ENCOUNTER — HOSPITAL ENCOUNTER (EMERGENCY)
Age: 27
Discharge: HOME | End: 2022-08-11
Attending: EMERGENCY MEDICINE
Payer: MEDICARE

## 2022-08-11 ENCOUNTER — APPOINTMENT (OUTPATIENT)
Dept: ULTRASOUND IMAGING | Age: 27
End: 2022-08-11
Payer: MEDICARE

## 2022-08-11 VITALS
SYSTOLIC BLOOD PRESSURE: 138 MMHG | HEART RATE: 89 BPM | BODY MASS INDEX: 34.99 KG/M2 | HEIGHT: 65 IN | RESPIRATION RATE: 16 BRPM | TEMPERATURE: 98.4 F | OXYGEN SATURATION: 99 % | WEIGHT: 210 LBS | DIASTOLIC BLOOD PRESSURE: 89 MMHG

## 2022-08-11 DIAGNOSIS — O99.891 ASYMPTOMATIC BACTERIURIA DURING PREGNANCY: ICD-10-CM

## 2022-08-11 DIAGNOSIS — O46.90 VAGINAL BLEEDING IN PREGNANCY: Primary | ICD-10-CM

## 2022-08-11 DIAGNOSIS — R82.71 ASYMPTOMATIC BACTERIURIA DURING PREGNANCY: ICD-10-CM

## 2022-08-11 LAB
A/G RATIO: 1.4 (ref 1.1–2.2)
ABO/RH: NORMAL
ALBUMIN SERPL-MCNC: 4.4 G/DL (ref 3.4–5)
ALP BLD-CCNC: 82 U/L (ref 40–129)
ALT SERPL-CCNC: 21 U/L (ref 10–40)
ANION GAP SERPL CALCULATED.3IONS-SCNC: 11 MMOL/L (ref 3–16)
AST SERPL-CCNC: 20 U/L (ref 15–37)
BACTERIA WET PREP: NORMAL
BASOPHILS ABSOLUTE: 0 K/UL (ref 0–0.2)
BASOPHILS RELATIVE PERCENT: 0.2 %
BILIRUB SERPL-MCNC: <0.2 MG/DL (ref 0–1)
BILIRUBIN URINE: NEGATIVE
BLOOD, URINE: ABNORMAL
BUN BLDV-MCNC: 8 MG/DL (ref 7–20)
CALCIUM SERPL-MCNC: 9.3 MG/DL (ref 8.3–10.6)
CHLORIDE BLD-SCNC: 102 MMOL/L (ref 99–110)
CLARITY: ABNORMAL
CLUE CELLS: NORMAL
CO2: 25 MMOL/L (ref 21–32)
COLOR: ABNORMAL
CREAT SERPL-MCNC: 0.6 MG/DL (ref 0.6–1.1)
EOSINOPHILS ABSOLUTE: 0.1 K/UL (ref 0–0.6)
EOSINOPHILS RELATIVE PERCENT: 0.8 %
EPITHELIAL CELLS WET PREP: NORMAL
EPITHELIAL CELLS, UA: ABNORMAL /HPF (ref 0–5)
GFR AFRICAN AMERICAN: >60
GFR NON-AFRICAN AMERICAN: >60
GLUCOSE BLD-MCNC: 100 MG/DL (ref 70–99)
GLUCOSE URINE: NEGATIVE MG/DL
GONADOTROPIN, CHORIONIC (HCG) QUANT: 8271 MIU/ML
HCG(URINE) PREGNANCY TEST: POSITIVE
HCT VFR BLD CALC: 37.9 % (ref 36–48)
HEMOGLOBIN: 12.7 G/DL (ref 12–16)
KETONES, URINE: NEGATIVE MG/DL
LEUKOCYTE ESTERASE, URINE: NEGATIVE
LYMPHOCYTES ABSOLUTE: 2.2 K/UL (ref 1–5.1)
LYMPHOCYTES RELATIVE PERCENT: 21.7 %
MCH RBC QN AUTO: 28.7 PG (ref 26–34)
MCHC RBC AUTO-ENTMCNC: 33.4 G/DL (ref 31–36)
MCV RBC AUTO: 85.9 FL (ref 80–100)
MICROSCOPIC EXAMINATION: YES
MONOCYTES ABSOLUTE: 0.7 K/UL (ref 0–1.3)
MONOCYTES RELATIVE PERCENT: 6.7 %
NEUTROPHILS ABSOLUTE: 7.3 K/UL (ref 1.7–7.7)
NEUTROPHILS RELATIVE PERCENT: 70.6 %
NITRITE, URINE: POSITIVE
PDW BLD-RTO: 13.4 % (ref 12.4–15.4)
PH UA: 6 (ref 5–8)
PLATELET # BLD: 295 K/UL (ref 135–450)
PMV BLD AUTO: 7.5 FL (ref 5–10.5)
POTASSIUM REFLEX MAGNESIUM: 4.1 MMOL/L (ref 3.5–5.1)
PROTEIN UA: ABNORMAL MG/DL
RBC # BLD: 4.41 M/UL (ref 4–5.2)
RBC UA: >100 /HPF (ref 0–4)
RBC WET PREP: NORMAL
SODIUM BLD-SCNC: 138 MMOL/L (ref 136–145)
SOURCE WET PREP: NORMAL
SPECIFIC GRAVITY UA: 1.01 (ref 1–1.03)
TOTAL PROTEIN: 7.5 G/DL (ref 6.4–8.2)
TRICHOMONAS PREP: NORMAL
URINE REFLEX TO CULTURE: ABNORMAL
URINE TYPE: ABNORMAL
UROBILINOGEN, URINE: 0.2 E.U./DL
WBC # BLD: 10.3 K/UL (ref 4–11)
WBC UA: ABNORMAL /HPF (ref 0–5)
WBC WET PREP: NORMAL
YEAST WET PREP: NORMAL

## 2022-08-11 PROCEDURE — 87186 SC STD MICRODIL/AGAR DIL: CPT

## 2022-08-11 PROCEDURE — 87591 N.GONORRHOEAE DNA AMP PROB: CPT

## 2022-08-11 PROCEDURE — 87210 SMEAR WET MOUNT SALINE/INK: CPT

## 2022-08-11 PROCEDURE — 36415 COLL VENOUS BLD VENIPUNCTURE: CPT

## 2022-08-11 PROCEDURE — 84703 CHORIONIC GONADOTROPIN ASSAY: CPT

## 2022-08-11 PROCEDURE — 76801 OB US < 14 WKS SINGLE FETUS: CPT

## 2022-08-11 PROCEDURE — 86901 BLOOD TYPING SEROLOGIC RH(D): CPT

## 2022-08-11 PROCEDURE — 85025 COMPLETE CBC W/AUTO DIFF WBC: CPT

## 2022-08-11 PROCEDURE — 99283 EMERGENCY DEPT VISIT LOW MDM: CPT

## 2022-08-11 PROCEDURE — 87491 CHLMYD TRACH DNA AMP PROBE: CPT

## 2022-08-11 PROCEDURE — 80053 COMPREHEN METABOLIC PANEL: CPT

## 2022-08-11 PROCEDURE — 84702 CHORIONIC GONADOTROPIN TEST: CPT

## 2022-08-11 PROCEDURE — 81001 URINALYSIS AUTO W/SCOPE: CPT

## 2022-08-11 PROCEDURE — 87086 URINE CULTURE/COLONY COUNT: CPT

## 2022-08-11 PROCEDURE — 87077 CULTURE AEROBIC IDENTIFY: CPT

## 2022-08-11 PROCEDURE — 86900 BLOOD TYPING SEROLOGIC ABO: CPT

## 2022-08-11 RX ORDER — CEPHALEXIN 500 MG/1
500 CAPSULE ORAL 3 TIMES DAILY
Qty: 15 CAPSULE | Refills: 0 | Status: SHIPPED | OUTPATIENT
Start: 2022-08-11 | End: 2022-08-16

## 2022-08-11 ASSESSMENT — ENCOUNTER SYMPTOMS
VOMITING: 0
BACK PAIN: 0
CHEST TIGHTNESS: 0
COUGH: 0
ABDOMINAL PAIN: 1
DIARRHEA: 0
CONSTIPATION: 0
EYE PAIN: 0
SORE THROAT: 0
RHINORRHEA: 0
SHORTNESS OF BREATH: 0
NAUSEA: 0

## 2022-08-11 ASSESSMENT — PAIN - FUNCTIONAL ASSESSMENT: PAIN_FUNCTIONAL_ASSESSMENT: NONE - DENIES PAIN

## 2022-08-11 NOTE — ED PROVIDER NOTES
[address] ED  EMERGENCY DEPARTMENT ENCOUNTER        Pt Name: Sara Escalera  MRN: [id number]  [place] 1995  Date of evaluation: 2022  Provider: Pecola Goldberg, APRN - CNP  PCP: SHANEL Hopkins CNP    This patient was seen and evaluated by the attending physician Mario Buck MD    I have evaluated this patient. My supervising physician was available for consultation. CHIEF COMPLAINT       Chief Complaint   Patient presents with    Vaginal Bleeding     Patient reports vaginal bleeding that started about 15 minutes ago while she was at work. Reports pregnancy, unsure how far along she is. HISTORY OF PRESENT ILLNESS   (Location/Symptom, Timing/Onset, Context/Setting, Quality, Duration, Modifying Factors, Severity)  Note limiting factors. Sara Escalera is a 32 y.o. female who presents via private car for  vaginal bleeding. Onset was this afternoon. Duration has been since the onset. Context includes patient reports she had a positive home urine pregnancy test one week ago at home. She states her last menstrual period was 6/3 and she is generally \"very regular\". She states that she has a first appointment with her OB next week and has not had a confirmed IUP with this pregnancy. She is . She states she had some lower abdominal cramping this morning that has since resolved. She denies any current abdominal pain, chest pain or shortness of breath. She denies any urinary symptoms or vaginal discharge prior to bleeding this morning. She has no concerns for sexually transmitted infections. Quality is nothing  with radiation to nothing. Alleviating factors include nothing. Aggravating factors include nothing. Pain is 0/10. Nothing has been used for pain today. Chart review reveals pt has no significant past medical history. Nursing Notes were all reviewed and agreed with or any disagreements were addressed  in the HPI.     Pt was seen during the COVID 19 pandemic. Appropriate PPE worn by ME during patient encounters. Pt seen during a time with constrained hospital bed capacity and other potential inpatient and outpatient resources were constrained due to the viral pandemic. REVIEW OF SYSTEMS    (2-9 systems for level 4, 10 or more for level 5)     Review of Systems   Constitutional:  Negative for chills, diaphoresis and fever. HENT:  Negative for congestion, rhinorrhea and sore throat. Eyes:  Negative for pain and visual disturbance. Respiratory:  Negative for cough, chest tightness and shortness of breath. Cardiovascular:  Negative for chest pain, palpitations and leg swelling. Gastrointestinal:  Positive for abdominal pain. Negative for constipation, diarrhea, nausea and vomiting. Abdominal cramping   Genitourinary:  Positive for hematuria and vaginal bleeding. Negative for difficulty urinating, dysuria, flank pain, frequency, pelvic pain, urgency, vaginal discharge and vaginal pain. Musculoskeletal:  Negative for back pain and neck pain. Skin:  Negative for rash and wound. Neurological:  Negative for dizziness and light-headedness. Positives and Pertinent negatives as per HPI. Except as noted abovein the ROS, all other systems were reviewed and negative. PAST MEDICAL HISTORY   History reviewed. No pertinent past medical history. SURGICAL HISTORY     Past Surgical History:   Procedure Laterality Date    BONY PELVIS SURGERY      CHOLECYSTECTOMY      SHOULDER SURGERY Right 06/30/2020    (R) [employer]         CURRENTMEDICATIONS       Previous Medications    ALBUTEROL SULFATE HFA (PROAIR HFA) 108 (90 BASE) MCG/ACT INHALER    Use every 4 hours 2 puffs while awake for 7-10 days then PRN wheezing  Dispense with SPACER and Instruct on use. May sub Ventolin or Proventil as needed per [employer].     DICLOFENAC (VOLTAREN) 75 MG EC TABLET    Take 1 tablet by mouth 2 times daily for 7 days         ALLERGIES     Patient has no known allergies. FAMILYHISTORY     History reviewed. No pertinent family history. SOCIAL HISTORY       Social History     Socioeconomic History    Marital status: Single     Spouse name: None    Number of children: None    Years of education: None    Highest education level: None   Tobacco Use    Smoking status: Every Day     Packs/day: 0.50     Types: Cigarettes, E-Cigarettes    Smokeless tobacco: Never   Vaping Use    Vaping Use: Every day    Substances: Nicotine   Substance and Sexual Activity    Alcohol use: Yes    Drug use: Yes     Types: Marijuana (Weed)       SCREENINGS    [place] Coma Scale  Eye Opening: Spontaneous  Best Verbal Response: Oriented  Best Motor Response: Obeys commands  [place] Coma Scale Score: 15        PHYSICAL EXAM    (up to 7 for level 4, 8 or more for level 5)     ED Triage Vitals [08/11/22 1312]   BP Temp Temp src Heart Rate Resp SpO2 Height Weight   (!) 152/99 98.4 °F (36.9 °C) -- 99 16 99 % 5' 5\" (1.651 m) 210 lb (95.3 kg)       Physical Exam  Vitals and nursing note reviewed. Exam conducted with a chaperone present. Constitutional:       Appearance: Normal appearance. She is not ill-appearing or diaphoretic. HENT:      Head: Normocephalic and atraumatic. Right Ear: External ear normal.      Left Ear: External ear normal.      Mouth/Throat:      Mouth: Mucous membranes are moist.      Pharynx: Oropharynx is clear. Eyes:      General:         Right eye: No discharge. Left eye: No discharge. Cardiovascular:      Rate and Rhythm: Normal rate and regular rhythm. Pulses: Normal pulses. Heart sounds: Normal heart sounds. No murmur heard. No friction rub. No gallop. Pulmonary:      Effort: Pulmonary effort is normal. No respiratory distress. Breath sounds: Normal breath sounds. No stridor. No wheezing, rhonchi or rales. Abdominal:      General: Bowel sounds are normal.      Palpations: Abdomen is soft.       Tenderness: no abdominal tenderness There is no right CVA tenderness or left CVA tenderness. Hernia: There is no hernia in the left inguinal area or right inguinal area. Genitourinary:     General: Normal vulva. Exam position: Lithotomy position. Pubic Area: No rash or pubic lice. Labia:         Right: No rash, tenderness, lesion or injury. Left: No rash, tenderness, lesion or injury. Urethra: No prolapse, urethral pain, urethral swelling or urethral lesion. Vagina: Normal.      Cervix: Cervical bleeding present. No cervical motion tenderness, discharge, friability, lesion, erythema or eversion. Uterus: Not deviated, not enlarged, not fixed, not tender and no uterine prolapse. Adnexa: Right adnexa normal and left adnexa normal.        Right: No mass, tenderness or fullness. Left: No mass, tenderness or fullness. Musculoskeletal:         General: Normal range of motion. Cervical back: Normal range of motion and neck supple. Lymphadenopathy:      Lower Body: No right inguinal adenopathy. No left inguinal adenopathy. Skin:     General: Skin is warm and dry. Capillary Refill: Capillary refill takes less than 2 seconds. Neurological:      General: No focal deficit present. Mental Status: She is alert and oriented to person, place, and time.    Psychiatric:         Mood and Affect: Mood normal.         Behavior: Behavior normal.     PHYSICAL EXAM  BP (!) 152/99   Pulse 99   Temp 98.4 °F (36.9 °C)   Resp 16   Ht 5' 5\" (1.651 m)   Wt 210 lb (95.3 kg)   LMP  (LMP Unknown)   SpO2 99%   BMI 34.95 kg/m²       DIAGNOSTIC RESULTS   LABS:    Labs Reviewed   COMPREHENSIVE METABOLIC PANEL W/ REFLEX TO MG FOR LOW K - Abnormal; Notable for the following components:       Result Value    Glucose 100 (*)     All other components within normal limits   URINALYSIS WITH REFLEX TO CULTURE - Abnormal; Notable for the following components:    Color, UA RED (*)     Clarity, UA SL CLOUDY (*)     Blood, Urine LARGE (*)     Protein, UA TRACE (*)     Nitrite, Urine POSITIVE (*)     All other components within normal limits   MICROSCOPIC URINALYSIS - Abnormal; Notable for the following components:    RBC, UA >100 (*)     All other components within normal limits   WET PREP, GENITAL   C.TRACHOMATIS N.GONORRHOEAE DNA   CULTURE, URINE   CBC WITH AUTO DIFFERENTIAL   PREGNANCY, URINE   HCG, QUANTITATIVE, PREGNANCY   ABO/RH       All other labs were within normal range or not returned as of this dictation. EKG: All EKG's are interpreted by the Emergency Department Physician who either signs orCo-signs this chart in the absence of a cardiologist.  Please see their note for interpretation of EKG. RADIOLOGY:   Non-plain film images such as CT, Ultrasound and MRI are read by the radiologist. Plain radiographic images are visualized andpreliminarily interpreted by the  ED Provider with the below findings:        Interpretation perthe Radiologist below, if available at the time of this note:    US OB LESS THAN 14 [address]   Final Result   Intrauterine pregnancy with gestational sac measuring 5 weeks and 3 days with   questionable small fetal pole. No fetal heart rate detected, which could be   related to early gestation or failed pregnancy. Attention on follow-up   ultrasound recommended. No results found.        PROCEDURES   Unless otherwise noted below, none     Procedures    CRITICAL CARE TIME   N/A    CONSULTS:  None      EMERGENCY DEPARTMENT COURSE and DIFFERENTIALDIAGNOSIS/MDM:   Vitals:    Vitals:    08/11/22 1312   BP: (!) 152/99   Pulse: 99   Resp: 16   Temp: 98.4 °F (36.9 °C)   SpO2: 99%   Weight: 210 lb (95.3 kg)   Height: 5' 5\" (1.651 m)       Patient was given thefollowing medications:  Medications - No data to display    PDMP Monitoring:    Last PDMP Andrea Souza as Reviewed [facility]):  Review User Review Instant Review Result   Charles Hayes 8/4/2019  6:36 PM Reviewed PDMP [1]     Last Controlled Substance Monitoring Documentation      [address] [facility] ED from 2019 in [facility] ED   Periodic Controlled Substance Monitoring No signs of potential drug abuse or diversion identified. filed at 2019 1836   Acute Pain Prescriptions Severe pain not adequately treated with lower dose. filed at 2019 1836          Urine Drug Screenings (1 yr)    No resulted procedures found. Medication Contract and Consent for Opioid Use Documents Filed        No documents found                    MDM:   This patient was seen and evaluated by myself and my attending physician. She presents to the emergency department today with a 1 day history of vaginal bleeding. She is  currently pregnancy test from home. She has not had an IUP. On exam she is alert and oriented, hemodynamically stable and nontoxic in appearance. She will have a work-up in the emergency department consisting of laboratory studies and imaging. Ramping earlier today however states this has resolved and denies need for any pain control at this time. Laboratory studies and images were evaluated and reviewed with the patient. CBC negative for leukocytosis or anemias. CMP grossly negative. Urine pregnancy positive. Analysis reveals red in color with cloudy appearance, large blood, trace protein, nitrate positive. Microscopic urinalysis greater than 100 RBCs, no WBCs or epithelial cells. Secondary to presence of nitrites in the urine the patient will be treated as asymptomatic bacteriuria with Keflex. Patient is O+, will not require RhoGAM.  hCG positive at 8271. Patient denied concerns for sexually transmitted infections and denied any vaginal discharge prior to her vaginal bleeding today. Wet prep completed without any trichomonas, yeast, clue cells present. Gonorrhea and Chlamydia are pending at this time.   Patient declines empiric treatment as she has no concerns for sexually transmitted infection. Ultrasound interpreted by the radiologist for intrauterine pregnancy with gestational sac measuring 5 weeks and 3 days with questionable small fetal pole. No fetal heart rate detected, which could be related to early gestation or failed pregnancy. Attention on follow-up ultrasound is recommended. In discussing a plan for discharge with the patient I did instruct her to return in 48 hours to have a repeat hCG level drawn. She was instructed to follow-up with her OB/GYN. She does state that she has an appointment for later in the week next week however I did tell her to call tomorrow to see if they wanted an earlier appointment. She was provided with strict follow-up precautions for the emergency department including but not limited to increased bleeding, saturation of 1 pad per hour, chest pain, shortness of breath, dizziness or worsening abdominal pain. I did instruct her on pelvic rest.  The patient verbalized understanding of all discharge teaching and was ultimately discharged in a stable condition with all questions answered. Is this patient to be included in the SEP-1 Core Measure due to severe sepsis or septic shock? No   Exclusion criteria - the patient is NOT to be included for SEP-1 Core Measure due to:  2+ SIRS criteria are not met    Discharge Time out:  CC Reviewed Yes   Test Results Yes     Vitals:    08/11/22 1312   BP: (!) 152/99   Pulse: 99   Resp: 16   Temp: 98.4 °F (36.9 °C)   SpO2: 99%              FINAL IMPRESSION      1. Vaginal bleeding in pregnancy    2.  Asymptomatic bacteriuria during pregnancy          DISPOSITION/PLAN   DISPOSITION Discharge - Pending Orders Complete 08/11/2022 05:34:18 PM      PATIENT REFERREDTO:  Merna Ordoñez, APRN - CNP  [address] [address]  [phone]      As needed, If symptoms worsen    [place] [facility] [facility] ED  [address] [facility]  [phone]    As needed, If symptoms

## 2022-08-11 NOTE — DISCHARGE INSTRUCTIONS
Please call your OB/GYN tomorrow to discuss follow-up. They may want to evaluate you prior to your appointment next week. If you develop worsening abdominal pain, fevers or saturating more than 1 pad per hour, dizziness, chest pain or shortness of breath please return to the emergency department. An order was placed for you to have repeat blood work collected on Saturday to evaluate your hCG level. Your urine also showed some byproducts of bacteria and should be treated in early pregnancy. Please complete the entire course of Keflex.

## 2022-08-12 LAB
C TRACH DNA GENITAL QL NAA+PROBE: NEGATIVE
N. GONORRHOEAE DNA: NEGATIVE

## 2022-08-13 ENCOUNTER — HOSPITAL ENCOUNTER (EMERGENCY)
Age: 27
Discharge: HOME | End: 2022-08-13
Attending: EMERGENCY MEDICINE
Payer: MEDICARE

## 2022-08-13 VITALS
BODY MASS INDEX: 34.99 KG/M2 | SYSTOLIC BLOOD PRESSURE: 108 MMHG | TEMPERATURE: 98.3 F | HEIGHT: 65 IN | RESPIRATION RATE: 16 BRPM | DIASTOLIC BLOOD PRESSURE: 61 MMHG | OXYGEN SATURATION: 100 % | WEIGHT: 210 LBS | HEART RATE: 68 BPM

## 2022-08-13 DIAGNOSIS — O20.0 THREATENED MISCARRIAGE IN EARLY PREGNANCY: Primary | ICD-10-CM

## 2022-08-13 LAB
ANION GAP SERPL CALCULATED.3IONS-SCNC: 10 MMOL/L (ref 3–16)
BASOPHILS ABSOLUTE: 0.1 K/UL (ref 0–0.2)
BASOPHILS RELATIVE PERCENT: 0.4 %
BUN BLDV-MCNC: 8 MG/DL (ref 7–20)
CALCIUM SERPL-MCNC: 9.5 MG/DL (ref 8.3–10.6)
CHLORIDE BLD-SCNC: 100 MMOL/L (ref 99–110)
CO2: 24 MMOL/L (ref 21–32)
CREAT SERPL-MCNC: 0.7 MG/DL (ref 0.6–1.1)
EOSINOPHILS ABSOLUTE: 0.1 K/UL (ref 0–0.6)
EOSINOPHILS RELATIVE PERCENT: 0.8 %
GFR AFRICAN AMERICAN: >60
GFR NON-AFRICAN AMERICAN: >60
GLUCOSE BLD-MCNC: 102 MG/DL (ref 70–99)
GONADOTROPIN, CHORIONIC (HCG) QUANT: NORMAL MIU/ML
HCT VFR BLD CALC: 36.7 % (ref 36–48)
HEMOGLOBIN: 11.9 G/DL (ref 12–16)
LYMPHOCYTES ABSOLUTE: 3 K/UL (ref 1–5.1)
LYMPHOCYTES RELATIVE PERCENT: 26.1 %
MCH RBC QN AUTO: 28.3 PG (ref 26–34)
MCHC RBC AUTO-ENTMCNC: 32.4 G/DL (ref 31–36)
MCV RBC AUTO: 87.4 FL (ref 80–100)
MONOCYTES ABSOLUTE: 0.9 K/UL (ref 0–1.3)
MONOCYTES RELATIVE PERCENT: 8 %
NEUTROPHILS ABSOLUTE: 7.4 K/UL (ref 1.7–7.7)
NEUTROPHILS RELATIVE PERCENT: 64.7 %
PDW BLD-RTO: 13.2 % (ref 12.4–15.4)
PLATELET # BLD: 287 K/UL (ref 135–450)
PMV BLD AUTO: 7.4 FL (ref 5–10.5)
POTASSIUM SERPL-SCNC: 3.7 MMOL/L (ref 3.5–5.1)
RBC # BLD: 4.2 M/UL (ref 4–5.2)
SODIUM BLD-SCNC: 134 MMOL/L (ref 136–145)
WBC # BLD: 11.5 K/UL (ref 4–11)

## 2022-08-13 PROCEDURE — 85025 COMPLETE CBC W/AUTO DIFF WBC: CPT

## 2022-08-13 PROCEDURE — 99283 EMERGENCY DEPT VISIT LOW MDM: CPT

## 2022-08-13 PROCEDURE — 84702 CHORIONIC GONADOTROPIN TEST: CPT

## 2022-08-13 PROCEDURE — 80048 BASIC METABOLIC PNL TOTAL CA: CPT

## 2022-08-13 ASSESSMENT — PAIN - FUNCTIONAL ASSESSMENT
PAIN_FUNCTIONAL_ASSESSMENT: NONE - DENIES PAIN
PAIN_FUNCTIONAL_ASSESSMENT: NONE - DENIES PAIN

## 2022-08-13 NOTE — ED PROVIDER NOTES
CHIEF COMPLAINT  Vaginal Bleeding (Vaginal bleeding started Thursday; was seen at [facility] Thursday; stopped bleeding all day Friday; just a little bit ago blew her nose and then started bleeding again)      Jimmy Deluca  is a 32 y.o. female who presents to the ED at via private vehicle complaining of vaginal bleeding. States she is approximately 5 weeks pregnant. Was seen at [facility] a few days ago due to vaginal bleeding. Ultrasound at that time confirmed IUP. She denies having any bleeding yesterday however states today after some brief epistaxis she noticed that she had some vaginal bleeding. Has only gone through 1 pad. States it was red in color roughly the amount is a normal menstrual cycle. No report of focal abdominal pain, fever, dysuria. Has follow-up scheduled with OB on Thursday. There are no other complaints, modifying factors or associated symptoms. Nursing notes reviewed. Past medical history:  has no past medical history on file. Past surgical history:  has a past surgical history that includes Cholecystectomy; Bony pelvis surgery; and shoulder surgery (Right, 06/30/2020). Home medications:   Prior to Admission medications    Medication Sig Start Date End Date Taking? Authorizing Provider   cephALEXin (KEFLEX) 500 MG capsule Take 1 capsule by mouth in the morning and 1 capsule at noon and 1 capsule before bedtime. Do all this for 5 days. 8/11/22 8/16/22  SHANEL Shah - CNP   albuterol sulfate HFA (PROAIR HFA) 108 (90 Base) MCG/ACT inhaler Use every 4 hours 2 puffs while awake for 7-10 days then PRN wheezing  Dispense with SPACER and Instruct on use. May sub Ventolin or Proventil as needed per [employer]. Patient not taking: No sig reported 3/15/22   Mekhi Hurst PA-C       No Known Allergies    Social history:  reports that she has been smoking cigarettes and e-cigarettes.  She has been smoking an average of .5 packs per day. She has never used smokeless tobacco. She reports current alcohol use. She reports current drug use. Drug: Marijuana Padmaja Bakari). Family history:  No family history on file. REVIEW OF SYSTEMS  6 systems reviewed, pertinent positives per HPI otherwise noted to be negative    PHYSICAL EXAM  Vitals:    08/13/22 0258   BP: 108/61   Pulse: 68   Resp: 16   Temp: 98.3 °F (36.8 °C)   SpO2: 100%       GENERAL: Patient is well-developed, well-nourished,  no acute distress. No apparent discomfort. Non toxic appearing. HEENT:  Normocephalic, atraumatic. PERRL. Conjunctiva appear normal.  External ears are normal.  MMM  NECK: Supple with normal ROM. Trachea midline  LUNGS:  Normal work of breathing. Speaking comfortably in full sentences. EXTREMITIES: 2+ distal pulses w/o edema. GI: Soft, nontender, nondistended, normal bowel sounds  MUSCULOSKELETAL:  Atraumatic extremities with normal ROM grossly. No obvious bony deformities. SKIN: Warm/dry. No rashes/lesions noted. PSYCHIATRIC: Patient is alert and oriented with normal affect  NEUROLOGIC: Cranial nerves grossly intact. Moves all extremities with equal strength. No gross sensory deficits. Answers questions/follows commands appropriately. ED COURSE/MDM  Nursing notes reviewed. Patient with vaginal bleeding in early pregnancy concerning for threatened miscarriage. Was seen in the ER a few days ago and IUP was confirmed by ultrasound. Does not take blood thinners. Has only gone through a single pad so far this evening. hCG levels are rising. She has OB/GYN follow-up scheduled later this week. Advised to return with any significant increase and vaginal bleeding as well as focal abdominal pain, fever, chills, lightheadedness. Clinical Impression  Based on the presenting complaint, history, and physical exam, multiple diagnoses were considered. Exam and workup here most c/w:  1.  Threatened miscarriage in early pregnancy        I discussed with Ellen Mackey the results of evaluation in the ED, diagnosis, care, and prognosis. The plan is to discharge to home. Patient is in agreement with plan and questions have been answered. I also discussed with Ellen Mackey the reasons which may require a return visit and the importance of follow-up care. The patient is well-appearing, nontoxic, and improved at the time of discharge. Patient agrees to call to arrange follow-up care as directed. Ellen Kidd understands to return immediately for worsening/change in symptoms. Patient will be started on the following medications from the ED:  Discharge Medication List as of 8/13/2022  2:51 AM            Disposition  Pt is discharged in stable condition.     Disposition Vitals:  /61   Pulse 68   Temp 98.3 °F (36.8 °C) (Oral)   Resp 16   Ht 5' 5\" (1.651 m)   Wt 210 lb (95.3 kg)   LMP  (LMP Unknown)   SpO2 100%   BMI 34.95 kg/m²                    Ml Brooks MD  08/13/22 0288

## 2022-08-14 LAB
ORGANISM: ABNORMAL
URINE CULTURE, ROUTINE: ABNORMAL
URINE CULTURE, ROUTINE: ABNORMAL

## 2022-08-15 ENCOUNTER — HOSPITAL ENCOUNTER (EMERGENCY)
Age: 27
Discharge: HOME | End: 2022-08-15
Attending: EMERGENCY MEDICINE
Payer: MEDICARE

## 2022-08-15 ENCOUNTER — APPOINTMENT (OUTPATIENT)
Dept: ULTRASOUND IMAGING | Age: 27
End: 2022-08-15
Payer: MEDICARE

## 2022-08-15 VITALS
BODY MASS INDEX: 34.95 KG/M2 | SYSTOLIC BLOOD PRESSURE: 110 MMHG | HEART RATE: 61 BPM | WEIGHT: 210 LBS | OXYGEN SATURATION: 100 % | TEMPERATURE: 98.6 F | DIASTOLIC BLOOD PRESSURE: 74 MMHG | RESPIRATION RATE: 18 BRPM

## 2022-08-15 DIAGNOSIS — O20.0 THREATENED MISCARRIAGE: Primary | ICD-10-CM

## 2022-08-15 LAB
BASOPHILS ABSOLUTE: 0 K/UL (ref 0–0.2)
BASOPHILS RELATIVE PERCENT: 0.4 %
EOSINOPHILS ABSOLUTE: 0.1 K/UL (ref 0–0.6)
EOSINOPHILS RELATIVE PERCENT: 1.2 %
GONADOTROPIN, CHORIONIC (HCG) QUANT: NORMAL MIU/ML
HCT VFR BLD CALC: 35.8 % (ref 36–48)
HEMOGLOBIN: 11.8 G/DL (ref 12–16)
LYMPHOCYTES ABSOLUTE: 2.4 K/UL (ref 1–5.1)
LYMPHOCYTES RELATIVE PERCENT: 29 %
MCH RBC QN AUTO: 28.9 PG (ref 26–34)
MCHC RBC AUTO-ENTMCNC: 32.9 G/DL (ref 31–36)
MCV RBC AUTO: 87.9 FL (ref 80–100)
MONOCYTES ABSOLUTE: 0.7 K/UL (ref 0–1.3)
MONOCYTES RELATIVE PERCENT: 8 %
NEUTROPHILS ABSOLUTE: 5 K/UL (ref 1.7–7.7)
NEUTROPHILS RELATIVE PERCENT: 61.4 %
PDW BLD-RTO: 13.1 % (ref 12.4–15.4)
PLATELET # BLD: 267 K/UL (ref 135–450)
PMV BLD AUTO: 7.5 FL (ref 5–10.5)
RBC # BLD: 4.07 M/UL (ref 4–5.2)
WBC # BLD: 8.2 K/UL (ref 4–11)

## 2022-08-15 PROCEDURE — 76801 OB US < 14 WKS SINGLE FETUS: CPT

## 2022-08-15 PROCEDURE — 84702 CHORIONIC GONADOTROPIN TEST: CPT

## 2022-08-15 PROCEDURE — 76817 TRANSVAGINAL US OBSTETRIC: CPT

## 2022-08-15 PROCEDURE — 99284 EMERGENCY DEPT VISIT MOD MDM: CPT

## 2022-08-15 PROCEDURE — 85025 COMPLETE CBC W/AUTO DIFF WBC: CPT

## 2022-08-15 ASSESSMENT — PAIN - FUNCTIONAL ASSESSMENT: PAIN_FUNCTIONAL_ASSESSMENT: NONE - DENIES PAIN

## 2022-08-15 ASSESSMENT — LIFESTYLE VARIABLES: HOW OFTEN DO YOU HAVE A DRINK CONTAINING ALCOHOL: NEVER

## 2022-08-15 NOTE — Clinical Note
Mario Ruelas was seen and treated in our emergency department on 8/15/2022. She may return to work on 08/19/2022. If you have any questions or concerns, please don't hesitate to call.       Vivek Gutierrez MD

## 2022-08-15 NOTE — ED PROVIDER NOTES
[facility] Emergency Department      CHIEF COMPLAINT  Vaginal Bleeding (Approx 5-6 weeks pregnant, 3rd pregnancy)      HISTORY OF PRESENT ILLNESS  Nilsa Porter is a 32 y.o. female who is otherwise healthy presents with vaginal bleeding. She is approximately 5 to 6 weeks pregnant. She was seen at [place] last Thursday when her bleeding started. She had an ultrasound done that did show an intrauterine pregnancy but no fetal heart tones yet. She states she ended up going back the following day because when her bleeding initially tapered off it started again. Her beta hCG level was rising appropriately. She is scheduled to see her OB/GYN this Thursday. She states over the weekend her bleeding seemed to stop within it started up again this morning and she got scared so she decided to come here. She is not having any fevers. No abdominal pain or cramping. She is G3, P2. She states since arriving here the bleeding seems to have stopped. No other complaints, modifying factors or associated symptoms. I have reviewed the following from the nursing documentation. History reviewed. No pertinent past medical history. Past Surgical History:   Procedure Laterality Date    BONY PELVIS SURGERY      CHOLECYSTECTOMY      SHOULDER SURGERY Right 06/30/2020    (R) [employer]     History reviewed. No pertinent family history.   Social History     Socioeconomic History    Marital status: Single     Spouse name: Not on file    Number of children: Not on file    Years of education: Not on file    Highest education level: Not on file   Occupational History    Not on file   Tobacco Use    Smoking status: Every Day     Packs/day: 0.50     Types: Cigarettes, E-Cigarettes    Smokeless tobacco: Never   Vaping Use    Vaping Use: Every day    Substances: Nicotine   Substance and Sexual Activity    Alcohol use: Yes    Drug use: Yes     Types: Marijuana Araseli Osvaldo)    Sexual activity: Not on file   Other Topics Concern    Not on file   Social History Narrative    Not on file     Social Determinants of Health     Financial Resource Strain: Not on file   Food Insecurity: Not on file   Transportation Needs: Not on file   Physical Activity: Not on file   Stress: Not on file   Social Connections: Not on file   Intimate Partner Violence: Not on file   Housing Stability: Not on file     No current facility-administered medications for this encounter. Current Outpatient Medications   Medication Sig Dispense Refill    albuterol sulfate HFA (PROAIR HFA) 108 (90 Base) MCG/ACT inhaler Use every 4 hours 2 puffs while awake for 7-10 days then PRN wheezing  Dispense with SPACER and Instruct on use. May sub Ventolin or Proventil as needed per [employer]. (Patient not taking: No sig reported) 18 g 1     No Known Allergies    REVIEW OF SYSTEMS      General:  No fevers  Eyes:  No recent vison changes  ENT:  No sore throat, no nasal congestion  Cardiovascular:  no palpitations  Respiratory:   no cough, no wheezing  Gastrointestinal:  No abdominal pain, no vomiting, no diarrhea  Musculoskeletal:  No muscle pain, no joint pain  Skin:  No rash   Neurologic:  No speech problems, no headache, no extremity numbness, no extremity weakness  Genitourinary:  No dysuria. Vaginal spotting  Extremities:  no edema, no pain      Unless otherwise stated in this report, this patient's positive and negative responses for review of systems (constitutional, eyes, ENT, cardiovascular, respiratory, gastrointestinal, neurological, genitourinary, musculoskeletal, integument systems and systems related to the presenting problem) are either stated in the preceding paragraph, were not pertinent or were negative for the symptoms and/or complaints related to the medical problem.     PHYSICAL EXAM  /74   Pulse 61   Temp 98.6 °F (37 °C) (Oral)   Resp 18   Wt 210 lb (95.3 kg)   LMP 06/03/2022   SpO2 100%   BMI 34.95 kg/m²   GENERAL APPEARANCE: Awake and alert. Cooperative. No acute distress. HEAD: Normocephalic. Atraumatic. EYES: PERRL. EOM's grossly intact. ENT: Mucous membranes are moist.   NECK: Supple, trachea midline. HEART: RRR. LUNGS: Respirations unlabored. Speaking comfortably in full sentences. ABDOMEN: Soft. Non-distended. Non-tender. No guarding or rebound. EXTREMITIES: No peripheral edema. MAEE. No acute deformities. SKIN: Warm, dry and intact. No acute rashes. NEUROLOGICAL: Alert and oriented X 3. PSYCHIATRIC: Normal mood and affect. LABS  I have reviewed all labs for this visit. Results for orders placed or performed during the hospital encounter of 08/15/22   HCG, Quantitative, Pregnancy   Result Value Ref Range    hCG Quant 89255.0 <5.0 mIU/mL   CBC with Auto Differential   Result Value Ref Range    WBC 8.2 4.0 - 11.0 K/uL    RBC 4.07 4.00 - 5.20 M/uL    Hemoglobin 11.8 (L) 12.0 - 16.0 g/dL    Hematocrit 35.8 (L) 36.0 - 48.0 %    MCV 87.9 80.0 - 100.0 fL    MCH 28.9 26.0 - 34.0 pg    MCHC 32.9 31.0 - 36.0 g/dL    RDW 13.1 12.4 - 15.4 %    Platelets 811 539 - 347 K/uL    MPV 7.5 5.0 - 10.5 fL    Neutrophils % 61.4 %    Lymphocytes % 29.0 %    Monocytes % 8.0 %    Eosinophils % 1.2 %    Basophils % 0.4 %    Neutrophils Absolute 5.0 1.7 - 7.7 K/uL    Lymphocytes Absolute 2.4 1.0 - 5.1 K/uL    Monocytes Absolute 0.7 0.0 - 1.3 K/uL    Eosinophils Absolute 0.1 0.0 - 0.6 K/uL    Basophils Absolute 0.0 0.0 - 0.2 K/uL               RADIOLOGY  X-RAYS: ALL IMAGES INCLUDING PLAIN FILMS, CT, ULTRASOUND AND MRI HAVE BEEN READ BY THE RADIOLOGIST. I have personally reviewed plain film images and have reviewed the radiology reports. US OB TRANSVAGINAL   Final Result   Single living intrauterine gestation, with an estimated gestational age of 11   weeks 6 days. The estimated date of delivery is 04/11/2023. Rechecks: Physical assessment performed.   Patient has been updated on her lab work and ultrasound findings today. Sepsis:  Is this patient to be included in the SEP-1 Core Measure due to severe sepsis or septic shock? No   Exclusion criteria - the patient is NOT to be included for SEP-1 Core Measure due to: Infection is not suspected         ED COURSE/MDM  Patient seen and evaluated. Here the patient is afebrile with normal vitals signs. Old records reviewed. The patient is well-appearing. She has a benign abdominal exam.  I repeated her beta hCG level today and it is 21,000. It has almost doubled since Friday. Ultrasound today continues to show viable IUP with fetal heart tones present. Blood type is O+. I will instruct her to keep her follow-up appointment with her OB this coming Thursday. Labs and imaging reviewed and results discussed with patient. Patient was reassessed as noted above . Plan of care discussed with patient. Patient in agreement with plan. Strict return precautions have been given. Patient was given scripts for the following medications. I counseled patient how to take these medications. Discharge Medication List as of 8/15/2022  2:55 PM        No prescriptions given. CLINICAL IMPRESSION  1. Threatened miscarriage        Blood pressure 110/74, pulse 61, temperature 98.6 °F (37 °C), temperature source Oral, resp. rate 18, weight 210 lb (95.3 kg), last menstrual period 06/03/2022, SpO2 100 %. Hetal Talbert was discharged to home in stable condition. Enio Franco MD am the primary clinician of record.     (Please note this note was completed with a voice recognition program.  Efforts were made to edit the dictations but occasionally words are mis-transcribed.)        Rachel Dudley MD  08/17/22 9924

## 2022-08-15 NOTE — ED PROVIDER NOTES
I personally saw Sarkis Esparza and performed a substantive portion of the visit including all aspects of the medical decision making. .    In brief, this is a 32year old female  presenting with vaginal bleeding. The bleeding started this afternoon roughly one pad per hour. FDLMP is 6/3. She denies any abdominal pain or cramping at present. On exam, she is non toxic appearing. Heart is regular rate and rhythm. Abdomen is soft, non tender and non distended there are no peritoneal signs. See pelvic exam performed by RUBÉN. ED course: the patient presents with vaginal bleeding. Vital signs are within normal limits. She is non toxic appearing on exam with a benign abdominal exam. Hcg is positive. She is not anemic. Pelvic ultrasound shows IUP no cardiac motion noted however patient is early on in the pregnancy at 5 weeks. She is RH+. I had a long discussion with the patient regarding following up with OB she will have another hcg ordered for 48 hours. I have low suspicion for ectopic. She remains hemodynamically stable in the ED and I feel she is safe for discharge home. We discussed strict return precautions and she voices understanding. All diagnostic, treatment, and disposition decisions were made by myself in conjunction with the advanced practice provider. For all further details of the patient's emergency department visit, please see the advanced practice provider's documentation. Comment: Please note this report has been produced using speech recognition software and may contain errors related to that system including errors in grammar, punctuation, and spelling, as well as words and phrases that may be inappropriate. If there are any questions or concerns please feel free to contact the dictating provider for clarification.        Allan [place]  [phone]

## 2022-10-20 ENCOUNTER — ANCILLARY PROCEDURE (OUTPATIENT)
Dept: EMERGENCY DEPT | Age: 27
End: 2022-10-20
Payer: MEDICARE

## 2022-10-20 ENCOUNTER — HOSPITAL ENCOUNTER (EMERGENCY)
Age: 27
Discharge: HOME | End: 2022-10-20
Attending: EMERGENCY MEDICINE
Payer: MEDICARE

## 2022-10-20 VITALS
BODY MASS INDEX: 35.36 KG/M2 | TEMPERATURE: 98.5 F | WEIGHT: 220 LBS | RESPIRATION RATE: 16 BRPM | DIASTOLIC BLOOD PRESSURE: 56 MMHG | SYSTOLIC BLOOD PRESSURE: 106 MMHG | OXYGEN SATURATION: 98 % | HEIGHT: 66 IN | HEART RATE: 69 BPM

## 2022-10-20 DIAGNOSIS — O46.90 VAGINAL BLEEDING IN PREGNANCY: Primary | ICD-10-CM

## 2022-10-20 DIAGNOSIS — O20.0 THREATENED MISCARRIAGE: ICD-10-CM

## 2022-10-20 LAB
ABO/RH: NORMAL
AMORPHOUS: ABNORMAL /HPF
ANION GAP SERPL CALCULATED.3IONS-SCNC: 13 MMOL/L (ref 3–16)
BACTERIA WET PREP: NORMAL
BACTERIA: ABNORMAL /HPF
BILIRUBIN URINE: NEGATIVE
BLOOD, URINE: ABNORMAL
BUN BLDV-MCNC: 8 MG/DL (ref 7–20)
CALCIUM SERPL-MCNC: 9.8 MG/DL (ref 8.3–10.6)
CHLORIDE BLD-SCNC: 97 MMOL/L (ref 99–110)
CLARITY: ABNORMAL
CLUE CELLS: NORMAL
CO2: 23 MMOL/L (ref 21–32)
COLOR: YELLOW
CREAT SERPL-MCNC: <0.5 MG/DL (ref 0.6–1.1)
EPITHELIAL CELLS WET PREP: NORMAL
EPITHELIAL CELLS, UA: ABNORMAL /HPF (ref 0–5)
GFR SERPL CREATININE-BSD FRML MDRD: >60 ML/MIN/{1.73_M2}
GLUCOSE BLD-MCNC: 102 MG/DL (ref 70–99)
GLUCOSE URINE: NEGATIVE MG/DL
GONADOTROPIN, CHORIONIC (HCG) QUANT: NORMAL MIU/ML
HCT VFR BLD CALC: 37.1 % (ref 36–48)
HEMOGLOBIN: 12.5 G/DL (ref 12–16)
KETONES, URINE: NEGATIVE MG/DL
LEUKOCYTE ESTERASE, URINE: NEGATIVE
MCH RBC QN AUTO: 29.2 PG (ref 26–34)
MCHC RBC AUTO-ENTMCNC: 33.7 G/DL (ref 31–36)
MCV RBC AUTO: 86.7 FL (ref 80–100)
MICROSCOPIC EXAMINATION: YES
NITRITE, URINE: NEGATIVE
PDW BLD-RTO: 13.1 % (ref 12.4–15.4)
PH UA: 7 (ref 5–8)
PLATELET # BLD: 315 K/UL (ref 135–450)
PMV BLD AUTO: 7.3 FL (ref 5–10.5)
POTASSIUM REFLEX MAGNESIUM: 4.3 MMOL/L (ref 3.5–5.1)
PROTEIN UA: NEGATIVE MG/DL
RBC # BLD: 4.28 M/UL (ref 4–5.2)
RBC UA: ABNORMAL /HPF (ref 0–4)
RBC WET PREP: NORMAL
SODIUM BLD-SCNC: 133 MMOL/L (ref 136–145)
SOURCE WET PREP: NORMAL
SPECIFIC GRAVITY UA: 1.01 (ref 1–1.03)
TRICHOMONAS PREP: NORMAL
URINE REFLEX TO CULTURE: ABNORMAL
URINE TYPE: ABNORMAL
UROBILINOGEN, URINE: 0.2 E.U./DL
WBC # BLD: 14.7 K/UL (ref 4–11)
WBC UA: ABNORMAL /HPF (ref 0–5)
WBC WET PREP: NORMAL
YEAST WET PREP: NORMAL

## 2022-10-20 PROCEDURE — 87210 SMEAR WET MOUNT SALINE/INK: CPT

## 2022-10-20 PROCEDURE — 81001 URINALYSIS AUTO W/SCOPE: CPT

## 2022-10-20 PROCEDURE — 86900 BLOOD TYPING SEROLOGIC ABO: CPT

## 2022-10-20 PROCEDURE — 87591 N.GONORRHOEAE DNA AMP PROB: CPT

## 2022-10-20 PROCEDURE — 85027 COMPLETE CBC AUTOMATED: CPT

## 2022-10-20 PROCEDURE — 76815 OB US LIMITED FETUS(S): CPT

## 2022-10-20 PROCEDURE — 36415 COLL VENOUS BLD VENIPUNCTURE: CPT

## 2022-10-20 PROCEDURE — 86901 BLOOD TYPING SEROLOGIC RH(D): CPT

## 2022-10-20 PROCEDURE — 80048 BASIC METABOLIC PNL TOTAL CA: CPT

## 2022-10-20 PROCEDURE — 84702 CHORIONIC GONADOTROPIN TEST: CPT

## 2022-10-20 PROCEDURE — 99284 EMERGENCY DEPT VISIT MOD MDM: CPT

## 2022-10-20 PROCEDURE — 87491 CHLMYD TRACH DNA AMP PROBE: CPT

## 2022-10-20 ASSESSMENT — PAIN DESCRIPTION - ORIENTATION: ORIENTATION: UPPER

## 2022-10-20 ASSESSMENT — PAIN - FUNCTIONAL ASSESSMENT: PAIN_FUNCTIONAL_ASSESSMENT: 0-10

## 2022-10-20 ASSESSMENT — PAIN SCALES - GENERAL
PAINLEVEL_OUTOF10: 1
PAINLEVEL_OUTOF10: 0

## 2022-10-20 ASSESSMENT — PAIN DESCRIPTION - LOCATION: LOCATION: ABDOMEN

## 2022-10-20 NOTE — ED NOTES
@65 Called OB Per   RE: Threatened miscarriage  @[id number] Eric Freeman called back and spoke to Auther Ask

## 2022-10-20 NOTE — Clinical Note
Tg Medrano was seen and treated in our emergency department on 10/20/2022. She may return to work on 10/21/2022. If you have any questions or concerns, please don't hesitate to call.       Yumiko Bernstein MD

## 2022-10-20 NOTE — ED NOTES
Assisted Dr Shravan Dinero with pelvic exam  Patient tolerated well. Specimens sent to lab.        James mUana RN  10/20/22 [id number]

## 2022-10-20 NOTE — ED PROVIDER NOTES
CHIEF COMPLAINT  Vaginal Bleeding (Pt is approx 15 weeks pregnant. Woke this morning to bleeding. Pt reports still having small bleeding now. Pt denies pain. No falls or injuries. )      HISTORY OF PRESENT ILLNESS  Chastity Miranda is a 32 y.o. female who is currently 15 weeks pregnant presenting for evaluation of vaginal bleeding. She states that she woke up this morning with some blood on her sheet. She states that she is having a small amount of bleeding now. She denies any significant pain, cramping. No recent falls or injuries. Denies any dysuria. Follows OB at [facility]. She had emergency department visit at the beginning of this pregnancy with concern for vaginal bleeding, threatened miscarriage. Ultrasound at that time did show an intrauterine pregnancy. No other complaints, modifying factors or associated symptoms. I have reviewed the following from the nursing documentation. No past medical history on file. Past Surgical History:   Procedure Laterality Date    BONY PELVIS SURGERY      CHOLECYSTECTOMY      SHOULDER SURGERY Right 06/30/2020    (R) [employer]     No family history on file.   Social History     Socioeconomic History    Marital status: Single     Spouse name: Not on file    Number of children: Not on file    Years of education: Not on file    Highest education level: Not on file   Occupational History    Not on file   Tobacco Use    Smoking status: Former     Packs/day: 0.50     Types: Cigarettes, E-Cigarettes    Smokeless tobacco: Never   Vaping Use    Vaping Use: Every day    Substances: Nicotine   Substance and Sexual Activity    Alcohol use: Yes    Drug use: Not Currently     Types: Marijuana Sarmiento Fogo)    Sexual activity: Not on file   Other Topics Concern    Not on file   Social History Narrative    Not on file     Social Determinants of Health     Financial Resource Strain: Not on file   Food Insecurity: Not on file   Transportation Needs: Not on file   Physical Activity: Not on file   Stress: Not on file   Social Connections: Not on file   Intimate Partner Violence: Not on file   Housing Stability: Not on file     No current facility-administered medications for this encounter. Current Outpatient Medications   Medication Sig Dispense Refill    albuterol sulfate HFA (PROAIR HFA) 108 (90 Base) MCG/ACT inhaler Use every 4 hours 2 puffs while awake for 7-10 days then PRN wheezing  Dispense with SPACER and Instruct on use. May sub Ventolin or Proventil as needed per [employer]. (Patient not taking: No sig reported) 18 g 1     No Known Allergies    REVIEW OF SYSTEMS  Positive and pertinent negatives as per HPI. All other systems were reviewed and are negative. PHYSICAL EXAM  BP (!) 135/90   Pulse 93   Temp 98.5 °F (36.9 °C) (Oral)   Resp 18   Ht 5' 6\" (1.676 m)   Wt 220 lb (99.8 kg)   LMP  (Approximate)   SpO2 97%   BMI 35.51 kg/m²   GENERAL APPEARANCE: Awake and alert. Cooperative. HEAD: Normocephalic. Atraumatic. EYES: PERRL. EOM's grossly intact. ENT: Mucous membranes are moist.   HEART: Intact radial pulses 2+ bilaterally. LUNGS: Respirations unlabored without accessory muscle use. Speaking comfortably in full sentences. ABDOMEN: Soft. Non-distended. Non-tender. No guarding or rebound. Pelvic: Exam performed with chaperone and patient consent. External genitalia appear normal. There is no rash or lesion. There is no cervical motion tenderness. There is no cervical discharge. The cervical os is closed. There is small amount of dark red blood in the vaginal vault. There is no adnexal tenderness bilaterally. EXTREMITIES: No peripheral edema. No acute deformities. SKIN: Warm and dry. No acute rashes. NEUROLOGICAL: Alert and oriented X 3. No focal deficits    LABS  I have reviewed all labs for this visit.    Results for orders placed or performed during the hospital encounter of 10/20/22   Wet prep, genital    Specimen: Vaginal   Result Value Ref Range    Trichomonas Prep None Seen     Yeast, Wet Prep None Seen     Clue Cells, Wet Prep None Seen     WBC, Wet Prep 1+     RBC, Wet Prep 3+     Epi Cells 1+     Bacteria <1+     Source Wet Prep Vaginal    Urinalysis with Reflex to Culture    Specimen: Urine   Result Value Ref Range    Color, UA Yellow Straw/Yellow    Clarity, UA SL CLOUDY (A) Clear    Glucose, Ur Negative Negative mg/dL    Bilirubin Urine Negative Negative    Ketones, Urine Negative Negative mg/dL    Specific Gravity, UA 1.015 1.005 - 1.030    Blood, Urine SMALL (A) Negative    pH, UA 7.0 5.0 - 8.0    Protein, UA Negative Negative mg/dL    Urobilinogen, Urine 0.2 <2.0 E.U./dL    Nitrite, Urine Negative Negative    Leukocyte Esterase, Urine Negative Negative    Microscopic Examination YES     Urine Type NotGiven     Urine Reflex to Culture Not Indicated    BMP w/ Reflex to MG   Result Value Ref Range    Sodium 133 (L) 136 - 145 mmol/L    Potassium reflex Magnesium 4.3 3.5 - 5.1 mmol/L    Chloride 97 (L) 99 - 110 mmol/L    CO2 23 21 - 32 mmol/L    Anion Gap 13 3 - 16    Glucose 102 (H) 70 - 99 mg/dL    BUN 8 7 - 20 mg/dL    Creatinine <0.5 (L) 0.6 - 1.1 mg/dL    Est, Glom Filt Rate >60 >60    Calcium 9.8 8.3 - 10.6 mg/dL   CBC   Result Value Ref Range    WBC 14.7 (H) 4.0 - 11.0 K/uL    RBC 4.28 4.00 - 5.20 M/uL    Hemoglobin 12.5 12.0 - 16.0 g/dL    Hematocrit 37.1 36.0 - 48.0 %    MCV 86.7 80.0 - 100.0 fL    MCH 29.2 26.0 - 34.0 pg    MCHC 33.7 31.0 - 36.0 g/dL    RDW 13.1 12.4 - 15.4 %    Platelets 506 046 - 653 K/uL    MPV 7.3 5.0 - 10.5 fL   HCG, Quantitative, Pregnancy   Result Value Ref Range    hCG Quant 89256.0 <5.0 mIU/mL   Microscopic Urinalysis   Result Value Ref Range    WBC, UA 0-2 0 - 5 /HPF    RBC, UA 3-4 0 - 4 /HPF    Epithelial Cells, UA 0-1 0 - 5 /HPF    Bacteria, UA Rare (A) None Seen /HPF    Amorphous, UA 3+ /HPF   ABO/RH   Result Value Ref Range    ABO/Rh O POS              RADIOLOGY  X-RAYS:  I have reviewed radiologic plain film image(s). ALL OTHER NON-PLAIN FILM IMAGES SUCH AS CT, ULTRASOUND AND MRI HAVE BEEN READ BY THE RADIOLOGIST. US ED PREG UTERUS LTD 1 OR MORE FETUSES   Final Result             US ED PREG UTERUS LTD 1 OR MORE FETUSES    Result Date: 10/20/2022  POCUS_OB_TAUS:     Exam Information:          Exam type:  Clinically indicated     Indication(s) for Exam:          The exam was performed with the following indications[de-identified]  Pregnancy, Vaginal bleeding     Views Obtained & Images Saved for These Views: The following structures were examined from a transabdominal approach[de-identified]  Uterus and pouch of Kp, Uterus long axis, Uterus transverse axis     Findings:          Definitive intra-uterine pregnancy?:  Present, with fetal pole         Fetal heart rate:  Present, beats/min =136         Free fluid in cul-de-sac[de-identified]  Absent         Subchorionic hemorrhage:  Absent     Interpretation:          Definitive IUP  Electronically signed by Serg Napoles on Thursday, October 20, 2022 at 7:34 AM : Attending:           During the patient's ED course, the patient was given:  Medications - No data to display     ED COURSE/MDM  Patient seen and evaluated. Old records reviewed. Labs and imaging reviewed and results discussed with patient. 58-year-old female presenting approximately 15 weeks pregnancy with concerns for vaginal bleeding. Patient arrives with stable vital signs. She denies any significant pain. Mildly hypertensive on initial exam.  She is afebrile. No significant abdominal tenderness. Bedside ultrasound shows intrauterine pregnancy with fetal heart rate in the 130s with positive fetal movement. ED evaluation clued basic labs,, type and screen, Rh.  I will discuss her care with on-call OB for [facility]. Pelvic exam reveals closed cervical os, dark red blood in the vaginal vault, no cervical motion tenderness. Patient is Rh+. Urinalysis without signs of infection. Hemoglobin is stable.   I spoke with on-call OB for [facility], they recommended for the patient to be discharged, call the office to set up a follow-up appointment. Patient is agreeable with this plan. At this time, patient symptoms are consistent with threatened miscarriage. The patient will be discharged from the emergency department. The patient was counseled on their diagnosis and any medications prescribed. They were advised on the need for PCP followup. They were counseled on the need to return to the emergency department if any of their symptoms were to worsen, change or have any other concerns. Discharged in stable condition. Patient was given scripts for the following medications. I counseled patient how to take these medications. New Prescriptions    No medications on file       CLINICAL IMPRESSION  1. Vaginal bleeding in pregnancy    2. Threatened miscarriage        Blood pressure (!) 135/90, pulse 93, temperature 98.5 °F (36.9 °C), temperature source Oral, resp. rate 18, height 5' 6\" (1.676 m), weight 220 lb (99.8 kg), SpO2 97 %. Marjorie Ceballos was discharged to home in stable condition. This chart was generated in part by using Dragon Dictation system and may contain errors related to that system including errors in grammar, punctuation, and spelling, as well as words and phrases that may be inappropriate. If there are any questions or concerns please feel free to contact the dictating provider for clarification.        Andi Romero MD  10/20/22 0042

## 2022-10-20 NOTE — DISCHARGE INSTRUCTIONS
Your ultrasound looked normal today. Please call your OB office and they will schedule follow-up for you soon. Please call their office if you have any continued vaginal bleeding or worsening of symptoms.

## 2022-10-21 LAB
C TRACH DNA GENITAL QL NAA+PROBE: NEGATIVE
N. GONORRHOEAE DNA: NEGATIVE

## 2022-10-30 ENCOUNTER — HOSPITAL ENCOUNTER (EMERGENCY)
Age: 27
Discharge: HOME | End: 2022-10-31
Attending: EMERGENCY MEDICINE
Payer: MEDICARE

## 2022-10-30 DIAGNOSIS — J10.1 INFLUENZA A: Primary | ICD-10-CM

## 2022-10-30 DIAGNOSIS — Z3A.16 16 WEEKS GESTATION OF PREGNANCY: ICD-10-CM

## 2022-10-30 DIAGNOSIS — E83.42 HYPOMAGNESEMIA: ICD-10-CM

## 2022-10-30 LAB
ANION GAP SERPL CALCULATED.3IONS-SCNC: 11 MMOL/L (ref 3–16)
BACTERIA: ABNORMAL /HPF
BASOPHILS ABSOLUTE: 0 K/UL (ref 0–0.2)
BASOPHILS RELATIVE PERCENT: 0.3 %
BILIRUBIN URINE: NEGATIVE
BLOOD, URINE: ABNORMAL
BUN BLDV-MCNC: 6 MG/DL (ref 7–20)
CALCIUM SERPL-MCNC: 8.6 MG/DL (ref 8.3–10.6)
CHLORIDE BLD-SCNC: 95 MMOL/L (ref 99–110)
CLARITY: CLEAR
CO2: 22 MMOL/L (ref 21–32)
COLOR: YELLOW
CREAT SERPL-MCNC: <0.5 MG/DL (ref 0.6–1.1)
EOSINOPHILS ABSOLUTE: 0 K/UL (ref 0–0.6)
EOSINOPHILS RELATIVE PERCENT: 0.2 %
EPITHELIAL CELLS, UA: ABNORMAL /HPF (ref 0–5)
GFR SERPL CREATININE-BSD FRML MDRD: >60 ML/MIN/{1.73_M2}
GLUCOSE BLD-MCNC: 96 MG/DL (ref 70–99)
GLUCOSE URINE: NEGATIVE MG/DL
HCG(URINE) PREGNANCY TEST: POSITIVE
HCT VFR BLD CALC: 33.8 % (ref 36–48)
HEMOGLOBIN: 11.4 G/DL (ref 12–16)
INFLUENZA A: DETECTED
INFLUENZA B: NOT DETECTED
KETONES, URINE: NEGATIVE MG/DL
LEUKOCYTE ESTERASE, URINE: NEGATIVE
LYMPHOCYTES ABSOLUTE: 0.9 K/UL (ref 1–5.1)
LYMPHOCYTES RELATIVE PERCENT: 9.2 %
MAGNESIUM: 1.4 MG/DL (ref 1.8–2.4)
MCH RBC QN AUTO: 28.9 PG (ref 26–34)
MCHC RBC AUTO-ENTMCNC: 33.9 G/DL (ref 31–36)
MCV RBC AUTO: 85.4 FL (ref 80–100)
MICROSCOPIC EXAMINATION: YES
MONOCYTES ABSOLUTE: 0.9 K/UL (ref 0–1.3)
MONOCYTES RELATIVE PERCENT: 9.7 %
MUCUS: ABNORMAL /LPF
NEUTROPHILS ABSOLUTE: 7.8 K/UL (ref 1.7–7.7)
NEUTROPHILS RELATIVE PERCENT: 80.6 %
NITRITE, URINE: NEGATIVE
PDW BLD-RTO: 13.2 % (ref 12.4–15.4)
PH UA: 7 (ref 5–8)
PLATELET # BLD: 240 K/UL (ref 135–450)
PMV BLD AUTO: 8.1 FL (ref 5–10.5)
POTASSIUM REFLEX MAGNESIUM: 3.5 MMOL/L (ref 3.5–5.1)
PROTEIN UA: NEGATIVE MG/DL
RBC # BLD: 3.95 M/UL (ref 4–5.2)
RBC UA: ABNORMAL /HPF (ref 0–4)
SARS-COV-2 RNA, RT PCR: NOT DETECTED
SODIUM BLD-SCNC: 128 MMOL/L (ref 136–145)
SPECIFIC GRAVITY UA: 1.02 (ref 1–1.03)
URINE REFLEX TO CULTURE: ABNORMAL
URINE TYPE: ABNORMAL
UROBILINOGEN, URINE: 0.2 E.U./DL
WBC # BLD: 9.7 K/UL (ref 4–11)
WBC UA: ABNORMAL /HPF (ref 0–5)

## 2022-10-30 PROCEDURE — 6370000000 HC RX 637 (ALT 250 FOR IP): Performed by: EMERGENCY MEDICINE

## 2022-10-30 PROCEDURE — 99284 EMERGENCY DEPT VISIT MOD MDM: CPT

## 2022-10-30 PROCEDURE — 83735 ASSAY OF MAGNESIUM: CPT

## 2022-10-30 PROCEDURE — 81001 URINALYSIS AUTO W/SCOPE: CPT

## 2022-10-30 PROCEDURE — 85025 COMPLETE CBC W/AUTO DIFF WBC: CPT

## 2022-10-30 PROCEDURE — 96365 THER/PROPH/DIAG IV INF INIT: CPT

## 2022-10-30 PROCEDURE — 84703 CHORIONIC GONADOTROPIN ASSAY: CPT

## 2022-10-30 PROCEDURE — 80048 BASIC METABOLIC PNL TOTAL CA: CPT

## 2022-10-30 PROCEDURE — 87636 SARSCOV2 & INF A&B AMP PRB: CPT

## 2022-10-30 PROCEDURE — 2580000003 HC RX 258: Performed by: EMERGENCY MEDICINE

## 2022-10-30 PROCEDURE — 36415 COLL VENOUS BLD VENIPUNCTURE: CPT

## 2022-10-30 PROCEDURE — 6360000002 HC RX W HCPCS: Performed by: EMERGENCY MEDICINE

## 2022-10-30 RX ORDER — SODIUM CHLORIDE, SODIUM LACTATE, POTASSIUM CHLORIDE, AND CALCIUM CHLORIDE .6; .31; .03; .02 G/100ML; G/100ML; G/100ML; G/100ML
1000 INJECTION, SOLUTION INTRAVENOUS ONCE
Status: COMPLETED | OUTPATIENT
Start: 2022-10-30 | End: 2022-10-30

## 2022-10-30 RX ORDER — ONDANSETRON 4 MG/1
4 TABLET, ORALLY DISINTEGRATING ORAL EVERY 8 HOURS PRN
Qty: 9 TABLET | Refills: 0 | Status: SHIPPED | OUTPATIENT
Start: 2022-10-30 | End: 2022-11-02

## 2022-10-30 RX ORDER — MAGNESIUM SULFATE 1 G/100ML
1000 INJECTION INTRAVENOUS ONCE
Status: COMPLETED | OUTPATIENT
Start: 2022-10-30 | End: 2022-10-31

## 2022-10-30 RX ORDER — ACETAMINOPHEN 325 MG/1
650 TABLET ORAL ONCE
Status: COMPLETED | OUTPATIENT
Start: 2022-10-30 | End: 2022-10-30

## 2022-10-30 RX ORDER — ACETAMINOPHEN 325 MG/1
650 TABLET ORAL EVERY 6 HOURS PRN
Qty: 40 TABLET | Refills: 0 | Status: SHIPPED | OUTPATIENT
Start: 2022-10-30 | End: 2022-11-04

## 2022-10-30 RX ORDER — OSELTAMIVIR PHOSPHATE 75 MG/1
75 CAPSULE ORAL 2 TIMES DAILY
Qty: 9 CAPSULE | Refills: 0 | Status: SHIPPED | OUTPATIENT
Start: 2022-10-30 | End: 2022-11-04

## 2022-10-30 RX ORDER — OSELTAMIVIR PHOSPHATE 75 MG/1
75 CAPSULE ORAL ONCE
Status: COMPLETED | OUTPATIENT
Start: 2022-10-31 | End: 2022-10-30

## 2022-10-30 RX ADMIN — OSELTAMIVIR PHOSPHATE 75 MG: 75 CAPSULE ORAL at 23:55

## 2022-10-30 RX ADMIN — SODIUM CHLORIDE, POTASSIUM CHLORIDE, SODIUM LACTATE AND CALCIUM CHLORIDE 1000 ML: 600; 310; 30; 20 INJECTION, SOLUTION INTRAVENOUS at 22:30

## 2022-10-30 RX ADMIN — MAGNESIUM SULFATE HEPTAHYDRATE 1000 MG: 1 INJECTION, SOLUTION INTRAVENOUS at 23:09

## 2022-10-30 RX ADMIN — ACETAMINOPHEN 650 MG: 325 TABLET ORAL at 22:29

## 2022-10-30 ASSESSMENT — PAIN DESCRIPTION - DESCRIPTORS
DESCRIPTORS: ACHING
DESCRIPTORS: ACHING

## 2022-10-30 ASSESSMENT — PAIN DESCRIPTION - LOCATION
LOCATION: GENERALIZED;HEAD
LOCATION: GENERALIZED

## 2022-10-30 ASSESSMENT — ENCOUNTER SYMPTOMS
SHORTNESS OF BREATH: 0
DIARRHEA: 0
COUGH: 1
ABDOMINAL PAIN: 0
BACK PAIN: 0
RHINORRHEA: 0
EYE REDNESS: 0
EYE DISCHARGE: 0
VOMITING: 0
CHEST TIGHTNESS: 0

## 2022-10-30 ASSESSMENT — PAIN - FUNCTIONAL ASSESSMENT
PAIN_FUNCTIONAL_ASSESSMENT: 0-10
PAIN_FUNCTIONAL_ASSESSMENT: ACTIVITIES ARE NOT PREVENTED

## 2022-10-30 ASSESSMENT — PAIN DESCRIPTION - PAIN TYPE: TYPE: ACUTE PAIN

## 2022-10-30 ASSESSMENT — PAIN DESCRIPTION - ORIENTATION: ORIENTATION: OTHER (COMMENT)

## 2022-10-30 ASSESSMENT — PAIN SCALES - GENERAL
PAINLEVEL_OUTOF10: 8
PAINLEVEL_OUTOF10: 7

## 2022-10-30 NOTE — Clinical Note
Ara Ponce was seen and treated in our emergency department on 10/30/2022. She may return to work on 11/07/2022. If you have any questions or concerns, please don't hesitate to call.       [address] [facility], DO

## 2022-10-31 VITALS
TEMPERATURE: 98.5 F | RESPIRATION RATE: 15 BRPM | HEART RATE: 96 BPM | SYSTOLIC BLOOD PRESSURE: 111 MMHG | OXYGEN SATURATION: 97 % | DIASTOLIC BLOOD PRESSURE: 65 MMHG

## 2022-10-31 NOTE — ED PROVIDER NOTES
Emergency Department Provider Note  Location: [place] [address] ED  10/30/2022     Patient Identification  Savita Grullon is a 32 y.o. female    Chief Complaint  Fever (Patient complaisn of fever, body aches and headache)      HPI    (History provided by patient)    Patient is a 32 y.o. female with a significant PMHx of 16 weeks pregnant, presenting to the emergency department today with a fever, body aches, chills, and some nasal congestion. Patiently, with decreased appetite today. Has some backaches, muscle aches, hip pain, leg pain, and shoulder pain. She denies any shortness of breath. Intermittently coughs when she feels like \"mucus gets stuck in there,\" but otherwise at this time does not have any shortness of breath or coughing in exam.  Denies any abdominal pain. Has had intermittent spotting to this pregnancy, currently very light, but that is normal for her. No abdominal cramping. No passage of apparent tissue. Recent ultrasound at her OB/GYN's office, per patient. On chart review, confirmed IUP. [place] any dysuria, hematuria. No nausea, vomiting, diarrhea. Took 50 of Tylenol approximately 5 hours ago, arrives to the ER febrile at 100.3. No known sick contacts. Has no injuries. Feels safe in all of her relationships. No concern for STIs. I have reviewed the following nursing documentation:  Allergies: No Known Allergies    Past medical history:  has no past medical history on file. Past surgical history:  has a past surgical history that includes Cholecystectomy; Bony pelvis surgery; and shoulder surgery (Right, 06/30/2020). Home medications:   Prior to Admission medications    Medication Sig Start Date End Date Taking?  Authorizing Provider   oseltamivir (TAMIFLU) 75 MG capsule Take 1 capsule by mouth 2 times daily for 5 days 10/30/22 11/4/22 Yes Nieves Andres,    acetaminophen (AMINOFEN) 325 MG tablet Take 2 tablets by mouth every 6 hours as needed for Pain 10/30/22 11/4/22 Yes Nieves Andres DO   ondansetron (ZOFRAN ODT) 4 MG disintegrating tablet Take 1 tablet by mouth every 8 hours as needed for Nausea or Vomiting 10/30/22 11/2/22 Yes Nieves Andres DO   albuterol sulfate HFA (PROAIR HFA) 108 (90 Base) MCG/ACT inhaler Use every 4 hours 2 puffs while awake for 7-10 days then PRN wheezing  Dispense with SPACER and Instruct on use. May sub Ventolin or Proventil as needed per [employer]. Patient not taking: No sig reported 3/15/22   Ramos Coppola PA-C       Social history:  reports that she has quit smoking. Her smoking use included cigarettes and e-cigarettes. She smoked an average of .5 packs per day. She has never used smokeless tobacco. She reports current alcohol use. She reports that she does not currently use drugs after having used the following drugs: Marijuana Garrel Calender). Family history:  No family history on file. ROS  Review of Systems   Constitutional:  Positive for appetite change, chills and fever. Negative for activity change and fatigue. HENT:  Negative for congestion and rhinorrhea. Eyes:  Negative for discharge and redness. Respiratory:  Positive for cough. Negative for chest tightness and shortness of breath. Cardiovascular:  Negative for chest pain and leg swelling. Gastrointestinal:  Negative for abdominal pain, diarrhea and vomiting. Genitourinary:  Positive for vaginal bleeding (spotting, chronic this pregnancy). Negative for dysuria, flank pain, frequency and pelvic pain. Musculoskeletal:  Negative for back pain and neck pain. Skin:  Negative for rash and wound. Neurological:  Negative for dizziness, light-headedness and headaches. Exam  Vitals:    10/30/22 2124 10/30/22 2230 10/30/22 2300 10/31/22 0000   BP: (!) 144/91 112/66 122/79 111/65   Pulse: (!) 121   96   Resp: 15      Temp: 100.3 °F (37.9 °C)   98.5 °F (36.9 °C)   TempSrc: Oral   Oral   SpO2: 97%            Physical Exam  Vitals reviewed.    Constitutional: General: She is not in acute distress. Appearance: Normal appearance. She is ill-appearing. Comments: Appears as if not feeling well, however interactive, conversational, pleasant, and in no acute clinical cardiopulmonary distress. HENT:      Head: Normocephalic and atraumatic. Right Ear: External ear normal.      Left Ear: External ear normal.      Nose: Congestion and rhinorrhea present. Mouth/Throat:      Mouth: Mucous membranes are moist.      Pharynx: Oropharynx is clear. Eyes:      General:         Right eye: No discharge. Left eye: No discharge. Conjunctiva/sclera: Conjunctivae normal.      Pupils: Pupils are equal, round, and reactive to light. Cardiovascular:      Rate and Rhythm: Regular rhythm. Tachycardia present. Heart sounds: No murmur heard. Pulmonary:      Effort: Pulmonary effort is normal. No respiratory distress. Breath sounds: Normal breath sounds. No wheezing or rales. Abdominal:      General: Abdomen is flat. There is no distension. Palpations: Abdomen is soft. Tenderness: There is no abdominal tenderness. Musculoskeletal:         General: No swelling or tenderness. Cervical back: Normal range of motion and neck supple. Right lower leg: No edema. Left lower leg: No edema. Skin:     General: Skin is warm and dry. Neurological:      General: No focal deficit present. Mental Status: She is alert and oriented to person, place, and time. Mental status is at baseline.    Psychiatric:         Mood and Affect: Mood normal.         Behavior: Behavior normal.       ED Course    ED Medication Orders (From admission, onward)      Start Ordered     Status Ordering Provider    10/31/22 0000 10/30/22 2346  oseltamivir (TAMIFLU) capsule 75 mg  ONCE         Last MAR action: Given - by Son Jaramillo on 10/30/22 at [address]    10/30/22 2300 10/30/22 2259  magnesium sulfate 1000 mg in dextrose 5% 100 mL IVPB ONCE         Last MAR action: Stopped - by Sarmad Miles on 10/31/22 at [address] [place] [address], ADELE GUTIÉRREZ    10/30/22 2215 10/30/22 2214  acetaminophen (TYLENOL) tablet 650 mg  ONCE         Last MAR action: Given - [facility]    10/30/22 2215 10/30/22 2214  lactated ringers bolus  ONCE         Last MAR action: Stopped [facility]            Radiology  No results found.     Labs  Results for orders placed or performed during the hospital encounter of 10/30/22   COVID-19 & Influenza Combo    Specimen: Nasopharyngeal Swab   Result Value Ref Range    SARS-CoV-2 RNA, RT PCR NOT DETECTED NOT DETECTED    INFLUENZA A DETECTED (A) NOT DETECTED    INFLUENZA B NOT DETECTED NOT DETECTED   Urinalysis with Reflex to Culture    Specimen: Urine   Result Value Ref Range    Color, UA Yellow Straw/Yellow    Clarity, UA Clear Clear    Glucose, Ur Negative Negative mg/dL    Bilirubin Urine Negative Negative    Ketones, Urine Negative Negative mg/dL    Specific Gravity, UA 1.020 1.005 - 1.030    Blood, Urine TRACE-INTACT (A) Negative    pH, UA 7.0 5.0 - 8.0    Protein, UA Negative Negative mg/dL    Urobilinogen, Urine 0.2 <2.0 E.U./dL    Nitrite, Urine Negative Negative    Leukocyte Esterase, Urine Negative Negative    Microscopic Examination YES     Urine Type NotGiven     Urine Reflex to Culture Not Indicated    Microscopic Urinalysis   Result Value Ref Range    Mucus, UA Rare (A) None Seen /LPF    WBC, UA None seen 0 - 5 /HPF    RBC, UA 5-10 (A) 0 - 4 /HPF    Epithelial Cells, UA 2-5 0 - 5 /HPF    Bacteria, UA Rare (A) None Seen /HPF   CBC with Auto Differential   Result Value Ref Range    WBC 9.7 4.0 - 11.0 K/uL    RBC 3.95 (L) 4.00 - 5.20 M/uL    Hemoglobin 11.4 (L) 12.0 - 16.0 g/dL    Hematocrit 33.8 (L) 36.0 - 48.0 %    MCV 85.4 80.0 - 100.0 fL    MCH 28.9 26.0 - 34.0 pg    MCHC 33.9 31.0 - 36.0 g/dL    RDW 13.2 12.4 - 15.4 %    Platelets 924 888 - 926 K/uL    MPV 8.1 5.0 - 10.5 fL    Neutrophils % 80.6 %    Lymphocytes % 9.2 %    Monocytes % 9.7 %    Eosinophils % 0.2 %    Basophils % 0.3 %    Neutrophils Absolute 7.8 (H) 1.7 - 7.7 K/uL    Lymphocytes Absolute 0.9 (L) 1.0 - 5.1 K/uL    Monocytes Absolute 0.9 0.0 - 1.3 K/uL    Eosinophils Absolute 0.0 0.0 - 0.6 K/uL    Basophils Absolute 0.0 0.0 - 0.2 K/uL   BMP w/ Reflex to MG   Result Value Ref Range    Sodium 128 (L) 136 - 145 mmol/L    Potassium reflex Magnesium 3.5 3.5 - 5.1 mmol/L    Chloride 95 (L) 99 - 110 mmol/L    CO2 22 21 - 32 mmol/L    Anion Gap 11 3 - 16    Glucose 96 70 - 99 mg/dL    BUN 6 (L) 7 - 20 mg/dL    Creatinine <0.5 (L) 0.6 - 1.1 mg/dL    Est, Glom Filt Rate >60 >60    Calcium 8.6 8.3 - 10.6 mg/dL   Pregnancy, urine   Result Value Ref Range    HCG(Urine) Pregnancy Test POSITIVE Detects HCG level >20 MIU/mL   Magnesium   Result Value Ref Range    Magnesium 1.40 (L) 1.80 - 2.40 mg/dL       Procedures  Procedures      MDM  Patient seen and evaluated. Relevant records reviewed. - Patient is a 32 y.o. female 16 weeks of gestation, per patient, that presents emergency department today with fevers, body aches, cough. This is her first day of symptoms. She has no pregnancy related complaints; abdominal pain, back pain, cramping. Does have intermittent vaginal bleeding this pregnancy, which she is not currently concerned about. Denies any nausea, vomiting, diarrhea. Laboratory evaluation today reveals hypomagnesemia 144, replaced in the ED. Slight hyponatremia in the setting of pregnancy, given IV fluids LR. Hemoglobin 11.4. Patient is influenza A positive. Will treat with Tamiflu. With Tylenol, IV fluids, and magnesium in the emergency department, patient says she feels somewhat better. Discharged home in stable condition. Discussed tricked return precautions. Follow-up with OB/GYN and PCP this week. Tamiflu, Tylenol, and patient asks for benefit-year-old prescription.   Also provided short course of Zofran. Medications   acetaminophen (TYLENOL) tablet 650 mg (650 mg Oral Given 10/30/22 2229)   lactated ringers bolus (0 mLs IntraVENous Stopped 10/30/22 2355)   magnesium sulfate 1000 mg in dextrose 5% 100 mL IVPB (0 mg IntraVENous Stopped 10/31/22 0012)   oseltamivir (TAMIFLU) capsule 75 mg (75 mg Oral Given 10/30/22 2355)       - I have a low concern for  other emergent process, and do not see indication for further work-up in the ER, as it is unlikely  and poses more risk than benefit. - I discussed the results, including any incidental findings, with patient. Questions answered. Patient/family agreeable to plan and express understanding of plan. Disposition:  Discharge to home in fair condition. Is this patient to be included in the SEP-1 Core Measure due to severe sepsis or septic shock? No   Exclusion criteria - the patient is NOT to be included for SEP-1 Core Measure due to:  Viral etiology found or highly suspected (including COVID-19) without concomitant bacterial infection      Clinical Impression:  1. Influenza A    2. 16 weeks gestation of pregnancy    3. Hypomagnesemia        Blood pressure 111/65, pulse 96, temperature 98.5 °F (36.9 °C), temperature source Oral, resp. rate 15, last menstrual period 06/03/2022, SpO2 97 %. Patient was given prescriptions for the following medications. I counseled patient how to take these medications.    Discharge Medication List as of 10/30/2022 11:50 PM        START taking these medications    Details   oseltamivir (TAMIFLU) 75 MG capsule Take 1 capsule by mouth 2 times daily for 5 days, Disp-9 capsule, R-0Normal      acetaminophen (AMINOFEN) 325 MG tablet Take 2 tablets by mouth every 6 hours as needed for Pain, Disp-40 tablet, R-0Normal      ondansetron (ZOFRAN ODT) 4 MG disintegrating tablet Take 1 tablet by mouth every 8 hours as needed for Nausea or Vomiting, Disp-9 tablet, R-0Normal Disposition referral (if applicable):  [facility]  [address] [facility] [address]  [phone]  Schedule an appointment as soon as possible for a visit       Vishnu Mcbride, [address] [facility]  [address] [place] [address]  [phone]    Schedule an appointment as soon as possible for a visit       your OBGYN this week    Schedule an appointment as soon as possible for a visit       Aminata CUELLO DO, nancy the primary attending of record and contributed the majority of evaluation and treatment of emergent care for this encounter. This chart was generated in part by using Dragon Dictation system and may contain errors related to that system including errors in grammar, punctuation, and spelling, as well as words and phrases that may be inappropriate. If there are any questions or concerns please feel free to contact the dictating provider for clarification.      ADELE LOPEZ DO   [employer]        Eric Covarrubias DO  10/31/22 0204